# Patient Record
Sex: FEMALE | Race: BLACK OR AFRICAN AMERICAN | NOT HISPANIC OR LATINO | URBAN - METROPOLITAN AREA
[De-identification: names, ages, dates, MRNs, and addresses within clinical notes are randomized per-mention and may not be internally consistent; named-entity substitution may affect disease eponyms.]

---

## 2024-02-19 ENCOUNTER — OFFICE VISIT (OUTPATIENT)
Dept: PRIMARY CARE CLINIC | Facility: CLINIC | Age: 64
End: 2024-02-19
Payer: MEDICARE

## 2024-02-19 ENCOUNTER — TELEPHONE (OUTPATIENT)
Dept: PRIMARY CARE CLINIC | Facility: CLINIC | Age: 64
End: 2024-02-19

## 2024-02-19 VITALS
WEIGHT: 222 LBS | SYSTOLIC BLOOD PRESSURE: 132 MMHG | DIASTOLIC BLOOD PRESSURE: 86 MMHG | HEART RATE: 80 BPM | BODY MASS INDEX: 36.99 KG/M2 | OXYGEN SATURATION: 97 % | TEMPERATURE: 98 F | HEIGHT: 65 IN

## 2024-02-19 DIAGNOSIS — F43.9 STRESS: ICD-10-CM

## 2024-02-19 DIAGNOSIS — E08.65 DIABETES MELLITUS DUE TO UNDERLYING CONDITION WITH HYPERGLYCEMIA, WITHOUT LONG-TERM CURRENT USE OF INSULIN: ICD-10-CM

## 2024-02-19 DIAGNOSIS — Z00.00 HEALTHCARE MAINTENANCE: ICD-10-CM

## 2024-02-19 DIAGNOSIS — E11.9 TYPE 2 DIABETES MELLITUS WITHOUT COMPLICATION, WITHOUT LONG-TERM CURRENT USE OF INSULIN: ICD-10-CM

## 2024-02-19 DIAGNOSIS — E66.01 SEVERE OBESITY (BMI 35.0-39.9) WITH COMORBIDITY: ICD-10-CM

## 2024-02-19 DIAGNOSIS — E66.9 OBESITY (BMI 35.0-39.9 WITHOUT COMORBIDITY): ICD-10-CM

## 2024-02-19 DIAGNOSIS — Z76.89 ENCOUNTER TO ESTABLISH CARE WITH NEW DOCTOR: Primary | ICD-10-CM

## 2024-02-19 DIAGNOSIS — Z12.31 ENCOUNTER FOR SCREENING MAMMOGRAM FOR MALIGNANT NEOPLASM OF BREAST: ICD-10-CM

## 2024-02-19 PROBLEM — E78.5 HYPERLIPIDEMIA LDL GOAL <100: Status: ACTIVE | Noted: 2021-03-01

## 2024-02-19 LAB
ALBUMIN SERPL BCP-MCNC: 3.8 G/DL (ref 3.5–5.2)
ALP SERPL-CCNC: 114 U/L (ref 55–135)
ALT SERPL W/O P-5'-P-CCNC: 32 U/L (ref 10–44)
ANION GAP SERPL CALC-SCNC: 11 MMOL/L (ref 8–16)
AST SERPL-CCNC: 23 U/L (ref 10–40)
BILIRUB SERPL-MCNC: 0.2 MG/DL (ref 0.1–1)
BUN SERPL-MCNC: 15 MG/DL (ref 8–23)
CALCIUM SERPL-MCNC: 10 MG/DL (ref 8.7–10.5)
CHLORIDE SERPL-SCNC: 107 MMOL/L (ref 95–110)
CHOLEST SERPL-MCNC: 194 MG/DL (ref 120–199)
CHOLEST/HDLC SERPL: 4.6 {RATIO} (ref 2–5)
CO2 SERPL-SCNC: 24 MMOL/L (ref 23–29)
CREAT SERPL-MCNC: 0.8 MG/DL (ref 0.5–1.4)
ERYTHROCYTE [DISTWIDTH] IN BLOOD BY AUTOMATED COUNT: 14.2 % (ref 11.5–14.5)
EST. GFR  (NO RACE VARIABLE): >60 ML/MIN/1.73 M^2
GLUCOSE SERPL-MCNC: 84 MG/DL (ref 70–110)
HCT VFR BLD AUTO: 35.8 % (ref 37–48.5)
HCV AB SERPL QL IA: NORMAL
HDLC SERPL-MCNC: 42 MG/DL (ref 40–75)
HDLC SERPL: 21.6 % (ref 20–50)
HGB BLD-MCNC: 11.3 G/DL (ref 12–16)
HIV 1+2 AB+HIV1 P24 AG SERPL QL IA: NORMAL
LDLC SERPL CALC-MCNC: 119 MG/DL (ref 63–159)
MCH RBC QN AUTO: 29.4 PG (ref 27–31)
MCHC RBC AUTO-ENTMCNC: 31.6 G/DL (ref 32–36)
MCV RBC AUTO: 93 FL (ref 82–98)
NONHDLC SERPL-MCNC: 152 MG/DL
PLATELET # BLD AUTO: 405 K/UL (ref 150–450)
PMV BLD AUTO: 9.7 FL (ref 9.2–12.9)
POTASSIUM SERPL-SCNC: 4.3 MMOL/L (ref 3.5–5.1)
PROT SERPL-MCNC: 8.1 G/DL (ref 6–8.4)
RBC # BLD AUTO: 3.84 M/UL (ref 4–5.4)
SODIUM SERPL-SCNC: 142 MMOL/L (ref 136–145)
TRIGL SERPL-MCNC: 165 MG/DL (ref 30–150)
TSH SERPL DL<=0.005 MIU/L-ACNC: 0.62 UIU/ML (ref 0.4–4)
WBC # BLD AUTO: 8.81 K/UL (ref 3.9–12.7)

## 2024-02-19 PROCEDURE — 86803 HEPATITIS C AB TEST: CPT | Performed by: FAMILY MEDICINE

## 2024-02-19 PROCEDURE — 3075F SYST BP GE 130 - 139MM HG: CPT | Mod: CPTII,S$GLB,, | Performed by: FAMILY MEDICINE

## 2024-02-19 PROCEDURE — 80061 LIPID PANEL: CPT | Performed by: FAMILY MEDICINE

## 2024-02-19 PROCEDURE — 84443 ASSAY THYROID STIM HORMONE: CPT | Performed by: FAMILY MEDICINE

## 2024-02-19 PROCEDURE — 3008F BODY MASS INDEX DOCD: CPT | Mod: CPTII,S$GLB,, | Performed by: FAMILY MEDICINE

## 2024-02-19 PROCEDURE — 3079F DIAST BP 80-89 MM HG: CPT | Mod: CPTII,S$GLB,, | Performed by: FAMILY MEDICINE

## 2024-02-19 PROCEDURE — 80053 COMPREHEN METABOLIC PANEL: CPT | Performed by: FAMILY MEDICINE

## 2024-02-19 PROCEDURE — 1101F PT FALLS ASSESS-DOCD LE1/YR: CPT | Mod: CPTII,S$GLB,, | Performed by: FAMILY MEDICINE

## 2024-02-19 PROCEDURE — 87389 HIV-1 AG W/HIV-1&-2 AB AG IA: CPT | Performed by: FAMILY MEDICINE

## 2024-02-19 PROCEDURE — 85027 COMPLETE CBC AUTOMATED: CPT | Performed by: FAMILY MEDICINE

## 2024-02-19 PROCEDURE — 1160F RVW MEDS BY RX/DR IN RCRD: CPT | Mod: CPTII,S$GLB,, | Performed by: FAMILY MEDICINE

## 2024-02-19 PROCEDURE — 99999 PR PBB SHADOW E&M-NEW PATIENT-LVL V: CPT | Mod: PBBFAC,,, | Performed by: FAMILY MEDICINE

## 2024-02-19 PROCEDURE — 4010F ACE/ARB THERAPY RXD/TAKEN: CPT | Mod: CPTII,S$GLB,, | Performed by: FAMILY MEDICINE

## 2024-02-19 PROCEDURE — 3288F FALL RISK ASSESSMENT DOCD: CPT | Mod: CPTII,S$GLB,, | Performed by: FAMILY MEDICINE

## 2024-02-19 PROCEDURE — 83036 HEMOGLOBIN GLYCOSYLATED A1C: CPT | Performed by: FAMILY MEDICINE

## 2024-02-19 PROCEDURE — 99204 OFFICE O/P NEW MOD 45 MIN: CPT | Mod: S$GLB,,, | Performed by: FAMILY MEDICINE

## 2024-02-19 PROCEDURE — 1159F MED LIST DOCD IN RCRD: CPT | Mod: CPTII,S$GLB,, | Performed by: FAMILY MEDICINE

## 2024-02-19 PROCEDURE — 1126F AMNT PAIN NOTED NONE PRSNT: CPT | Mod: CPTII,S$GLB,, | Performed by: FAMILY MEDICINE

## 2024-02-19 RX ORDER — AMLODIPINE BESYLATE 10 MG/1
10 TABLET ORAL DAILY
COMMUNITY

## 2024-02-19 RX ORDER — OLMESARTAN MEDOXOMIL 20 MG/1
20 TABLET ORAL DAILY
COMMUNITY

## 2024-02-19 RX ORDER — METFORMIN HYDROCHLORIDE 500 MG/1
500 TABLET ORAL 2 TIMES DAILY WITH MEALS
COMMUNITY

## 2024-02-19 NOTE — PATIENT INSTRUCTIONS
Lab work today  Refer to LCSW - stress  Refer to diabetes education   Mammogram screening  Return 1 week   Addresses vaccines due at next visit        If you are feeling unwell, we'd like to be the first ones to know here at [facility]! Please give us a call. Same day appointments are our top priority to keep you well and out of the emergency rooms and hospitals. Call [phone] for our direct line. After hours advice is always available. Please call [phone] after hours to speak to the on-call team.

## 2024-02-19 NOTE — ASSESSMENT & PLAN NOTE
Mammogram - order today  Pap - na- never had abnl ones  Pneumovax?  Vaccines due - flu, rsv, hzv, Td, pcv- address next visit next week

## 2024-02-19 NOTE — PROGRESS NOTES
"Patient Care Team:  No, Primary Doctor as PCP - General  No, Primary Doctor  No future appointments.    Subjective:      Patient ID: Ekaterina De La Cruz is a 63 y.o. female.    Chief Complaint: Establish Care (Establish care )      HPI      Review of Systems  PHQ-4 Score: 3         Objective:   /86 (BP Location: Left arm, Patient Position: Sitting)   Pulse 80   Temp 98.2 °F (36.8 °C) (Oral)   Ht 5' 5" (1.651 m)   Wt 100.7 kg (222 lb 0.1 oz)   SpO2 97%   BMI 36.94 kg/m²     Physical Exam        Assessment:       No diagnosis found.      Plan:     There are no diagnoses linked to this encounter.  "

## 2024-02-19 NOTE — PROGRESS NOTES
"Patient Care Team:  Karlee Hein MD as PCP - General (Family Medicine)  Karlee Hein MD  Future Appointments   Date Time Provider Department Center   3/25/2024 10:40 AM Karlee Hein MD [facility]   4/4/2024  1:00 PM [facility]3-BX [facility] [place]   5/27/2024  9:40 AM Claudio Barber, OD [facility]       Subjective:      Patient ID: Ekaterina De La Cruz is a 63 y.o. female.    Chief Complaint: Establish Care (Establish care )      HPI  New pt here to establish care. Previous PCP at [facility], last lab more than 1 year ago.   Hope to get on ozempic for weight control    Review of Systems  PHQ-4 Score: 3 - stress from caring for mom  Lives alone but cares for elderly frail mother stressful. Grandkids visit her often. Still working owns a driving school enjoys it  Active but not exercising regularly  Goal is to lose weight, learn more about diabetes - never given any education dx a few years ago  Diet exercise - briefly introduced DM diet. Brisk walk 30' 5 times a week      Objective:   /86 (BP Location: Left arm, Patient Position: Sitting)   Pulse 80   Temp 98.2 °F (36.8 °C) (Oral)   Ht 5' 5" (1.651 m)   Wt 100.7 kg (222 lb 0.1 oz)   SpO2 97%   BMI 36.94 kg/m²     Physical Exam  Vitals and nursing note reviewed.   Constitutional:       General: She is not in acute distress.     Appearance: She is well-developed. She is not diaphoretic.   HENT:      Head: Normocephalic and atraumatic.   Eyes:      Extraocular Movements: Extraocular movements intact.      Pupils: Pupils are equal, round, and reactive to light.   Cardiovascular:      Rate and Rhythm: Normal rate and regular rhythm.   Pulmonary:      Effort: Pulmonary effort is normal.      Breath sounds: Normal breath sounds.   Musculoskeletal:      Cervical back: Neck supple.   Lymphadenopathy:      Cervical: No cervical adenopathy.   Skin:     General: Skin is warm and dry.   Neurological:      General: No focal deficit present. "      Mental Status: She is alert and oriented to person, place, and time.      Cranial Nerves: No cranial nerve deficit.       Lab work today  Refer to LCSW - stress  Refer to diabetes education   Mammogram screening  Return 1 week   Addresses vaccines due at next visit - will review outside records further      Assessment:       1. Encounter to establish care with new doctor    2. Diabetes mellitus due to underlying condition with hyperglycemia, without long-term current use of insulin    3. Stress    4. Encounter for screening mammogram for malignant neoplasm of breast    5. Obesity (BMI 35.0-39.9 without comorbidity)    6. Type 2 diabetes mellitus without complication, without long-term current use of insulin    7. Severe obesity (BMI 35.0-39.9) with comorbidity    8. Healthcare maintenance          Plan:     Encounter to establish care with new doctor  -     Ambulatory referral/consult to Diabetes Education; Future; Expected date: 02/26/2024  -     TSH; Future; Expected date: 02/19/2024  -     HIV 1/2 Ag/Ab (4th Gen); Future; Expected date: 02/19/2024  -     Hepatitis C Antibody; Future; Expected date: 02/19/2024  -     Mammo Digital Screening Bilat; Future; Expected date: 02/19/2024    Diabetes mellitus due to underlying condition with hyperglycemia, without long-term current use of insulin  -     Ambulatory referral/consult to Diabetes Education; Future; Expected date: 02/26/2024  -     CBC Without Differential; Future; Expected date: 02/19/2024  -     Comprehensive Metabolic Panel; Future; Expected date: 02/19/2024  -     Lipid Panel; Future; Expected date: 02/19/2024  -     TSH; Future; Expected date: 02/19/2024  -     Hemoglobin A1C; Future; Expected date: 02/19/2024  -     HIV 1/2 Ag/Ab (4th Gen); Future; Expected date: 02/19/2024  -     Hepatitis C Antibody; Future; Expected date: 02/19/2024    Stress  -     Ambulatory referral/consult to [facility]; Future; Expected date:  02/26/2024    Encounter for screening mammogram for malignant neoplasm of breast  -     Mammo Digital Screening Bilat; Future; Expected date: 02/19/2024    Obesity (BMI 35.0-39.9 without comorbidity)    Type 2 diabetes mellitus without complication, without long-term current use of insulin    Severe obesity (BMI 35.0-39.9) with comorbidity    Healthcare maintenance  Comments:  Mammogram - order today  Pap - na- never had abnl ones  Pneumovax?  Vaccines due - flu, rsv, hzv, Td, pcv- address next visit next week

## 2024-02-20 LAB
ESTIMATED AVG GLUCOSE: 146 MG/DL (ref 68–131)
HBA1C MFR BLD: 6.7 % (ref 4–5.6)

## 2024-02-21 ENCOUNTER — TELEPHONE (OUTPATIENT)
Dept: PRIMARY CARE CLINIC | Facility: CLINIC | Age: 64
End: 2024-02-21

## 2024-02-21 NOTE — TELEPHONE ENCOUNTER
Spoke with patient in regards to referral to diabetes education. Patient states that she instructed the person that called her in scheduling that she would contact them to schedule when she has her schedule. MANOJ Eddy RN

## 2024-02-23 PROBLEM — E66.9 OBESITY (BMI 35.0-39.9 WITHOUT COMORBIDITY): Status: RESOLVED | Noted: 2024-02-19 | Resolved: 2024-02-23

## 2024-02-23 PROBLEM — E11.69 HYPERLIPIDEMIA ASSOCIATED WITH TYPE 2 DIABETES MELLITUS: Status: ACTIVE | Noted: 2021-03-01

## 2024-02-23 NOTE — ASSESSMENT & PLAN NOTE
The 10-year ASCVD risk score (Loreta ESCOBAR, et al., 2019) is: 22.7%    Values used to calculate the score:      Age: 63 years      Sex: Female      Is Non- : Yes      Diabetic: Yes      Tobacco smoker: No      Systolic Blood Pressure: 132 mmHg      Is BP treated: Yes      HDL Cholesterol: 42 mg/dL      Total Cholesterol: 194 mg/dL

## 2024-02-23 NOTE — PROGRESS NOTES
Patient Care Team:  No, Primary Doctor as PCP - General  No, Primary Doctor  Future Appointments   Date Time Provider Department Center   2/26/2024 10:00 AM Karlee Hein MD [facility]    4/4/2024  1:00 PM [facility] M[facility]MO[facility]-BX [facility] [place]       Subjective:      Patient ID: Ekaterina De La Cruz is a 63 y.o. female.    Chief Complaint: No chief complaint on file.      HPI  Follow up initial visit last week. Her to review labs, address care Gaps. Pt desires to get on weight loss meds. Todays concern: discoloration on face over a year  Last week check out note review:  Refer to LCSW  - appt pending  Refer to diabetes education - appt pending.   Mammogram screening - appt 4/4    Office Visit on 02/19/2024   Component Date Value Ref Range Status    Hepatitis C Ab 02/19/2024 Non-reactive  Non-reactive Final    HIV 1/2 Ag/Ab 02/19/2024 Non-reactive  Non-reactive Final    Hemoglobin A1C 02/19/2024 6.7 (H)  4.0 - 5.6 % Final    Comment: ADA Screening Guidelines:  5.7-6.4%  Consistent with prediabetes  >or=6.5%  Consistent with diabetes    High levels of fetal hemoglobin interfere with the HbA1C  assay. Heterozygous hemoglobin variants (HbS, HgC, etc)do  not significantly interfere with this assay.   However, presence of multiple variants may affect accuracy.      Estimated Avg Glucose 02/19/2024 146 (H)  68 - 131 mg/dL Final    TSH 02/19/2024 0.623  0.400 - 4.000 uIU/mL Final    Cholesterol 02/19/2024 194  120 - 199 mg/dL Final    Comment: The National Cholesterol Education Program (NCEP) has set the  following guidelines (reference ranges) for Cholesterol:  Optimal.....................<200 mg/dL  Borderline High.............200-239 mg/dL  High........................> or = 240 mg/dL      Triglycerides 02/19/2024 165 (H)  30 - 150 mg/dL Final    Comment: The National Cholesterol Education Program (NCEP) has set the  following guidelines (reference values) for triglycerides:  Normal......................<150  mg/dL  Borderline High.............150-199 mg/dL  High........................200-499 mg/dL      HDL 02/19/2024 42  40 - 75 mg/dL Final    Comment: The National Cholesterol Education Program (NCEP) has set the  following guidelines (reference values) for HDL Cholesterol:  Low...............<40 mg/dL  Optimal...........>60 mg/dL      LDL Cholesterol 02/19/2024 119.0  63.0 - 159.0 mg/dL Final    Comment: The National Cholesterol Education Program (NCEP) has set the  following guidelines (reference values) for LDL Cholesterol:  Optimal.......................<130 mg/dL  Borderline High...............130-159 mg/dL  High..........................160-189 mg/dL  Very High.....................>190 mg/dL      HDL/Cholesterol Ratio 02/19/2024 21.6  20.0 - 50.0 % Final    Total Cholesterol/HDL Ratio 02/19/2024 4.6  2.0 - 5.0 Final    Non-HDL Cholesterol 02/19/2024 152  mg/dL Final    Comment: Risk category and Non-HDL cholesterol goals:  Coronary heart disease (CHD)or equivalent (10-year risk of CHD >20%):  Non-HDL cholesterol goal     <130 mg/dL  Two or more CHD risk factors and 10-year risk of CHD <= 20%:  Non-HDL cholesterol goal     <160 mg/dL  0 to 1 CHD risk factor:  Non-HDL cholesterol goal     <190 mg/dL      Sodium 02/19/2024 142  136 - 145 mmol/L Final    Potassium 02/19/2024 4.3  3.5 - 5.1 mmol/L Final    Chloride 02/19/2024 107  95 - 110 mmol/L Final    CO2 02/19/2024 24  23 - 29 mmol/L Final    Glucose 02/19/2024 84  70 - 110 mg/dL Final    BUN 02/19/2024 15  8 - 23 mg/dL Final    Creatinine 02/19/2024 0.8  0.5 - 1.4 mg/dL Final    Calcium 02/19/2024 10.0  8.7 - 10.5 mg/dL Final    Total Protein 02/19/2024 8.1  6.0 - 8.4 g/dL Final    Albumin 02/19/2024 3.8  3.5 - 5.2 g/dL Final    Total Bilirubin 02/19/2024 0.2  0.1 - 1.0 mg/dL Final    Comment: For infants and newborns, interpretation of results should be based  on gestational age, weight and in agreement with clinical  observations.    Premature Infant  recommended reference ranges:  Up to 24 hours.............<8.0 mg/dL  Up to 48 hours............<12.0 mg/dL  3-5 days..................<15.0 mg/dL  6-29 days.................<15.0 mg/dL      Alkaline Phosphatase 02/19/2024 114  55 - 135 U/L Final    AST 02/19/2024 23  10 - 40 U/L Final    ALT 02/19/2024 32  10 - 44 U/L Final    eGFR 02/19/2024 >60.0  >60 mL/min/1.73 m^2 Final    Anion Gap 02/19/2024 11  8 - 16 mmol/L Final    WBC 02/19/2024 8.81  3.90 - 12.70 K/uL Final    RBC 02/19/2024 3.84 (L)  4.00 - 5.40 M/uL Final    Hemoglobin 02/19/2024 11.3 (L)  12.0 - 16.0 g/dL Final    Hematocrit 02/19/2024 35.8 (L)  37.0 - 48.5 % Final    MCV 02/19/2024 93  82 - 98 fL Final    MCH 02/19/2024 29.4  27.0 - 31.0 pg Final    MCHC 02/19/2024 31.6 (L)  32.0 - 36.0 g/dL Final    RDW 02/19/2024 14.2  11.5 - 14.5 % Final    Platelets 02/19/2024 405  150 - 450 K/uL Final    MPV 02/19/2024 9.7  9.2 - 12.9 fL Final        Review of Systems  PHQ-4 Score: 3   Last worry score 3    Active ongoing problems and medications, labs reviewed  Lives alone but cares for elderly frail mother, stressful. Grandkids visit her often. Still working owns a driving school enjoys it  Active but not exercising regularly  Goal is to lose weight, learn more about diabetes - never given any education dx a few years ago  Diet exercise - briefly introduced DM diet. Brisk walk 30' 5 times a week  ACP -     Objective:   There were no vitals taken for this visit.    Physical Exam  Vitals and nursing note reviewed.   Constitutional:       General: She is not in acute distress.     Appearance: Normal appearance. She is well-developed. She is not diaphoretic.   HENT:      Head: Normocephalic and atraumatic.   Eyes:      Extraocular Movements: Extraocular movements intact.      Pupils: Pupils are equal, round, and reactive to light.   Cardiovascular:      Rate and Rhythm: Normal rate and regular rhythm.      Pulses:           Dorsalis pedis pulses are 1+ on the  right side and 1+ on the left side.   Pulmonary:      Effort: Pulmonary effort is normal.      Breath sounds: Normal breath sounds.   Musculoskeletal:      Cervical back: Neck supple.   Feet:      Right foot:      Protective Sensation: 5 sites tested.  5 sites sensed.      Skin integrity: Dry skin present. No callus.      Toenail Condition: Fungal disease present.     Left foot:      Protective Sensation: 5 sites tested.  5 sites sensed.      Skin integrity: Dry skin present. No callus.      Toenail Condition: Fungal disease present.  Lymphadenopathy:      Cervical: No cervical adenopathy.   Skin:     General: Skin is warm and dry.      Comments: Darkened skin upper face   Neurological:      General: No focal deficit present.      Mental Status: She is alert and oriented to person, place, and time.      Cranial Nerves: No cranial nerve deficit.       Assessment:       1. Essential hypertension    2. Hyperlipidemia associated with type 2 diabetes mellitus    3. Type 2 diabetes mellitus without complication, without long-term current use of insulin    4. Obesity (BMI 35.0-39.9 without comorbidity)    5. Severe obesity (BMI 35.0-39.9) with comorbidity    6. Health care maintenance      Tetnus boost, shingles and RSV vaccines due,  avail at pharmacy  New Rx today; lipitor 10 mg daily for cholesterol, mounjaro inj weekly for weight and DM  Dermatology referral  Please get familiar with MyChart    Plan:     1. Essential hypertension  Overview:  Goal < 130/80      2. Hyperlipidemia associated with type 2 diabetes mellitus  Overview:  LDL goal <70    Assessment & Plan:  The 10-year ASCVD risk score (Loreta ESCOBAR, et al., 2019) is: 22.7%    Values used to calculate the score:      Age: 63 years      Sex: Female      Is Non- : Yes      Diabetic: Yes      Tobacco smoker: No      Systolic Blood Pressure: 132 mmHg      Is BP treated: Yes      HDL Cholesterol: 42 mg/dL      Total Cholesterol: 194 mg/dL        3. Type 2 diabetes mellitus without complication, without long-term current use of insulin  Overview:  Dx 4 -5 years ago. On metformin    Assessment & Plan:  Lab Results   Component Value Date    HGBA1C 6.7 (H) 02/19/2024          4. Obesity (BMI 35.0-39.9 without comorbidity)    5. Severe obesity (BMI 35.0-39.9) with comorbidity  Assessment & Plan:  Rx Brisk walk 30' 5 times a week   Pt desires opempic      6. Health care maintenance        Health Maintenance         Date Due Completion Date    Diabetes Urine Screening Never done ---    Pneumococcal Vaccines (Age 65+) (1 of 2 - PCV) Never done ---    Foot Exam Never done ---    TETANUS VACCINE Never done ---    Mammogram Never done ---    DEXA Scan Never done ---    Shingles Vaccine (1 of 2) Never done ---    RSV Vaccine (Age 60+ and Pregnant patients) (1 - 1-dose 60+ series) Never done ---    Eye Exam 04/28/2022 4/28/2021    Influenza Vaccine (1) 09/01/2023 12/19/2022    COVID-19 Vaccine (3 - 2023-24 season) 02/23/2025 (Originally 9/1/2023) 4/22/2021    Hemoglobin A1c 08/19/2024 2/19/2024    Low Dose Statin 12/29/2024 12/29/2023    Lipid Panel 02/19/2025 2/19/2024    Colorectal Cancer Screening 08/10/2025 ---

## 2024-02-26 ENCOUNTER — OFFICE VISIT (OUTPATIENT)
Dept: PRIMARY CARE CLINIC | Facility: CLINIC | Age: 64
End: 2024-02-26
Payer: MEDICARE

## 2024-02-26 VITALS
HEIGHT: 65 IN | BODY MASS INDEX: 36.86 KG/M2 | HEART RATE: 74 BPM | OXYGEN SATURATION: 96 % | WEIGHT: 221.25 LBS | DIASTOLIC BLOOD PRESSURE: 86 MMHG | SYSTOLIC BLOOD PRESSURE: 140 MMHG | TEMPERATURE: 98 F

## 2024-02-26 DIAGNOSIS — L81.9 DISCOLORATION OF SKIN OF FACE: ICD-10-CM

## 2024-02-26 DIAGNOSIS — E66.01 SEVERE OBESITY (BMI 35.0-39.9) WITH COMORBIDITY: ICD-10-CM

## 2024-02-26 DIAGNOSIS — F33.9 MAJOR DEPRESSIVE DISORDER, RECURRENT EPISODE WITH ANXIOUS DISTRESS: ICD-10-CM

## 2024-02-26 DIAGNOSIS — I10 ESSENTIAL HYPERTENSION: ICD-10-CM

## 2024-02-26 DIAGNOSIS — Z23 NEED FOR VACCINATION: ICD-10-CM

## 2024-02-26 DIAGNOSIS — E11.9 TYPE 2 DIABETES MELLITUS WITHOUT COMPLICATION, WITHOUT LONG-TERM CURRENT USE OF INSULIN: Primary | ICD-10-CM

## 2024-02-26 DIAGNOSIS — Z00.00 HEALTH CARE MAINTENANCE: ICD-10-CM

## 2024-02-26 DIAGNOSIS — E11.69 HYPERLIPIDEMIA ASSOCIATED WITH TYPE 2 DIABETES MELLITUS: ICD-10-CM

## 2024-02-26 DIAGNOSIS — E78.5 HYPERLIPIDEMIA ASSOCIATED WITH TYPE 2 DIABETES MELLITUS: ICD-10-CM

## 2024-02-26 LAB
ALBUMIN/CREAT UR: 13.9 UG/MG (ref 0–30)
CREAT UR-MCNC: 36 MG/DL (ref 15–325)
MICROALBUMIN UR DL<=1MG/L-MCNC: 5 UG/ML

## 2024-02-26 PROCEDURE — 99999 PR PBB SHADOW E&M-EST. PATIENT-LVL IV: CPT | Mod: PBBFAC,,, | Performed by: FAMILY MEDICINE

## 2024-02-26 PROCEDURE — G0008 ADMIN INFLUENZA VIRUS VAC: HCPCS | Mod: S$GLB,,, | Performed by: FAMILY MEDICINE

## 2024-02-26 PROCEDURE — 3079F DIAST BP 80-89 MM HG: CPT | Mod: CPTII,S$GLB,, | Performed by: FAMILY MEDICINE

## 2024-02-26 PROCEDURE — 3008F BODY MASS INDEX DOCD: CPT | Mod: CPTII,S$GLB,, | Performed by: FAMILY MEDICINE

## 2024-02-26 PROCEDURE — 3077F SYST BP >= 140 MM HG: CPT | Mod: CPTII,S$GLB,, | Performed by: FAMILY MEDICINE

## 2024-02-26 PROCEDURE — 90694 VACC AIIV4 NO PRSRV 0.5ML IM: CPT | Mod: S$GLB,,, | Performed by: FAMILY MEDICINE

## 2024-02-26 PROCEDURE — 82043 UR ALBUMIN QUANTITATIVE: CPT | Performed by: FAMILY MEDICINE

## 2024-02-26 PROCEDURE — 1101F PT FALLS ASSESS-DOCD LE1/YR: CPT | Mod: CPTII,S$GLB,, | Performed by: FAMILY MEDICINE

## 2024-02-26 PROCEDURE — 1126F AMNT PAIN NOTED NONE PRSNT: CPT | Mod: CPTII,S$GLB,, | Performed by: FAMILY MEDICINE

## 2024-02-26 PROCEDURE — 90677 PCV20 VACCINE IM: CPT | Mod: S$GLB,,, | Performed by: FAMILY MEDICINE

## 2024-02-26 PROCEDURE — 1160F RVW MEDS BY RX/DR IN RCRD: CPT | Mod: CPTII,S$GLB,, | Performed by: FAMILY MEDICINE

## 2024-02-26 PROCEDURE — 4010F ACE/ARB THERAPY RXD/TAKEN: CPT | Mod: CPTII,S$GLB,, | Performed by: FAMILY MEDICINE

## 2024-02-26 PROCEDURE — 1159F MED LIST DOCD IN RCRD: CPT | Mod: CPTII,S$GLB,, | Performed by: FAMILY MEDICINE

## 2024-02-26 PROCEDURE — G0009 ADMIN PNEUMOCOCCAL VACCINE: HCPCS | Mod: S$GLB,,, | Performed by: FAMILY MEDICINE

## 2024-02-26 PROCEDURE — 3044F HG A1C LEVEL LT 7.0%: CPT | Mod: CPTII,S$GLB,, | Performed by: FAMILY MEDICINE

## 2024-02-26 PROCEDURE — 99214 OFFICE O/P EST MOD 30 MIN: CPT | Mod: 25,S$GLB,, | Performed by: FAMILY MEDICINE

## 2024-02-26 PROCEDURE — 3288F FALL RISK ASSESSMENT DOCD: CPT | Mod: CPTII,S$GLB,, | Performed by: FAMILY MEDICINE

## 2024-02-26 RX ORDER — ATORVASTATIN CALCIUM 10 MG/1
10 TABLET, FILM COATED ORAL DAILY
Qty: 90 TABLET | Refills: 3 | Status: SHIPPED | OUTPATIENT
Start: 2024-02-26

## 2024-02-26 RX ORDER — TIRZEPATIDE 5 MG/.5ML
5 INJECTION, SOLUTION SUBCUTANEOUS
Qty: 12 PEN | Refills: 3 | Status: SHIPPED | OUTPATIENT
Start: 2024-02-26

## 2024-02-26 NOTE — PATIENT INSTRUCTIONS
Tetnus boost, shingles and RSV vaccines due,  avail at pharmacy  New Rx today; lipitor 10 mg daily for cholesterol, mounjaro inj weekly for weight and DM  Dermatology referral  Please get familiar with MyChart    .If you are feeling unwell, we'd like to be the first ones to know here at [facility]! Please give us a call. Same day appointments are our top priority to keep you well and out of the emergency rooms and hospitals. Call [phone] for our direct line. After hours advice is always available. Please call [phone] after hours to speak to the on-call team.

## 2024-03-12 ENCOUNTER — CLINICAL SUPPORT (OUTPATIENT)
Dept: PRIMARY CARE CLINIC | Facility: CLINIC | Age: 64
End: 2024-03-12
Payer: MEDICARE

## 2024-03-12 DIAGNOSIS — E11.9 TYPE 2 DIABETES MELLITUS WITHOUT COMPLICATION, WITHOUT LONG-TERM CURRENT USE OF INSULIN: Primary | ICD-10-CM

## 2024-03-25 ENCOUNTER — OFFICE VISIT (OUTPATIENT)
Dept: PRIMARY CARE CLINIC | Facility: CLINIC | Age: 64
End: 2024-03-25
Payer: MEDICARE

## 2024-03-25 VITALS
DIASTOLIC BLOOD PRESSURE: 72 MMHG | HEART RATE: 81 BPM | OXYGEN SATURATION: 98 % | BODY MASS INDEX: 36.58 KG/M2 | SYSTOLIC BLOOD PRESSURE: 130 MMHG | HEIGHT: 65 IN | TEMPERATURE: 98 F | WEIGHT: 219.56 LBS

## 2024-03-25 DIAGNOSIS — E11.69 HYPERLIPIDEMIA ASSOCIATED WITH TYPE 2 DIABETES MELLITUS: ICD-10-CM

## 2024-03-25 DIAGNOSIS — E78.5 HYPERLIPIDEMIA ASSOCIATED WITH TYPE 2 DIABETES MELLITUS: ICD-10-CM

## 2024-03-25 DIAGNOSIS — Z78.0 ENCOUNTER FOR OSTEOPOROSIS SCREENING IN ASYMPTOMATIC POSTMENOPAUSAL PATIENT: ICD-10-CM

## 2024-03-25 DIAGNOSIS — Z00.00 HEALTHCARE MAINTENANCE: ICD-10-CM

## 2024-03-25 DIAGNOSIS — E11.9 TYPE 2 DIABETES MELLITUS WITHOUT COMPLICATION, WITHOUT LONG-TERM CURRENT USE OF INSULIN: Primary | ICD-10-CM

## 2024-03-25 DIAGNOSIS — Z13.820 ENCOUNTER FOR OSTEOPOROSIS SCREENING IN ASYMPTOMATIC POSTMENOPAUSAL PATIENT: ICD-10-CM

## 2024-03-25 DIAGNOSIS — I10 ESSENTIAL HYPERTENSION: ICD-10-CM

## 2024-03-25 DIAGNOSIS — F33.9 MAJOR DEPRESSIVE DISORDER, RECURRENT EPISODE WITH ANXIOUS DISTRESS: ICD-10-CM

## 2024-03-25 DIAGNOSIS — E66.01 SEVERE OBESITY (BMI 35.0-39.9) WITH COMORBIDITY: ICD-10-CM

## 2024-03-25 PROCEDURE — 3061F NEG MICROALBUMINURIA REV: CPT | Mod: CPTII,S$GLB,, | Performed by: FAMILY MEDICINE

## 2024-03-25 PROCEDURE — 1160F RVW MEDS BY RX/DR IN RCRD: CPT | Mod: CPTII,S$GLB,, | Performed by: FAMILY MEDICINE

## 2024-03-25 PROCEDURE — 4010F ACE/ARB THERAPY RXD/TAKEN: CPT | Mod: CPTII,S$GLB,, | Performed by: FAMILY MEDICINE

## 2024-03-25 PROCEDURE — 3044F HG A1C LEVEL LT 7.0%: CPT | Mod: CPTII,S$GLB,, | Performed by: FAMILY MEDICINE

## 2024-03-25 PROCEDURE — 1101F PT FALLS ASSESS-DOCD LE1/YR: CPT | Mod: CPTII,S$GLB,, | Performed by: FAMILY MEDICINE

## 2024-03-25 PROCEDURE — 3078F DIAST BP <80 MM HG: CPT | Mod: CPTII,S$GLB,, | Performed by: FAMILY MEDICINE

## 2024-03-25 PROCEDURE — 3075F SYST BP GE 130 - 139MM HG: CPT | Mod: CPTII,S$GLB,, | Performed by: FAMILY MEDICINE

## 2024-03-25 PROCEDURE — 1159F MED LIST DOCD IN RCRD: CPT | Mod: CPTII,S$GLB,, | Performed by: FAMILY MEDICINE

## 2024-03-25 PROCEDURE — 3008F BODY MASS INDEX DOCD: CPT | Mod: CPTII,S$GLB,, | Performed by: FAMILY MEDICINE

## 2024-03-25 PROCEDURE — 99999 PR PBB SHADOW E&M-EST. PATIENT-LVL V: CPT | Mod: PBBFAC,,, | Performed by: FAMILY MEDICINE

## 2024-03-25 PROCEDURE — 3288F FALL RISK ASSESSMENT DOCD: CPT | Mod: CPTII,S$GLB,, | Performed by: FAMILY MEDICINE

## 2024-03-25 PROCEDURE — 1126F AMNT PAIN NOTED NONE PRSNT: CPT | Mod: CPTII,S$GLB,, | Performed by: FAMILY MEDICINE

## 2024-03-25 PROCEDURE — 3066F NEPHROPATHY DOC TX: CPT | Mod: CPTII,S$GLB,, | Performed by: FAMILY MEDICINE

## 2024-03-25 PROCEDURE — 99214 OFFICE O/P EST MOD 30 MIN: CPT | Mod: S$GLB,,, | Performed by: FAMILY MEDICINE

## 2024-03-25 NOTE — PROGRESS NOTES
"Patient Care Team:  Karlee Hein MD as PCP - General (Family Medicine)  Karlee Hein MD  Future Appointments   Date Time Provider Department Center   4/1/2024  2:15 PM [facility] BMD1 [facility] DEX[facility]   4/1/2024  2:45 PM Eliza Rice, DPM [facility] POD [facility]   4/4/2024  1:00 PM [facility]3-BX [facility] [place]   5/27/2024  9:40 AM Claudio Barber, OD [facility] [place]   6/13/2024 10:00 AM Karlee Hein MD [facility]       Subjective:      Patient ID: Ekaterina De La Cruz is a 64 y.o. female.    Chief Complaint: Follow-up (1 month follow up)      HPI    Last visit 1 mo ago check out note:  "Tetnus boost, shingles and RSV vaccines due,  avail at pharmacy  New Rx today; lipitor 10 mg daily for cholesterol, mounjaro inj weekly for weight and DM  Dermatology referral  Please get familiar with MyChart"  Pt reports no side effect with lipitor. With mounjaro she only had 2 doses so far, could feel something different to the body, but no ill effect. Eats less  Work busy, glad appt not as frequent next go around     Review of Systems  PHQ-4 Score: 3   Last worry score 3    Active ongoing problems, labs and medications reviewed  Fall risks reviewed -   IADL/ADLs reviewed - independent. Lives   Diet and exercises reviewed - will start work out here in clinic  Goal/Matters most  Home BP -   Home BS -   ACP -   The 10-year ASCVD risk score (Loreta ESCOBAR, et al., 2019) is: 22.7%    Values used to calculate the score:      Age: 64 years      Sex: Female      Is Non- : Yes      Diabetic: Yes      Tobacco smoker: No      Systolic Blood Pressure: 130 mmHg      Is BP treated: Yes      HDL Cholesterol: 42 mg/dL      Total Cholesterol: 194 mg/dL     Objective:   /72 (BP Location: Left arm, Patient Position: Sitting)   Pulse 81   Temp 98 °F (36.7 °C) (Oral)   Ht 5' 5" (1.651 m)   Wt 99.6 kg (219 lb 9.3 oz)   SpO2 98%   BMI 36.54 kg/m²     Physical Exam  Vitals and " nursing note reviewed.   Constitutional:       Appearance: Normal appearance.   Neurological:      General: No focal deficit present.      Mental Status: She is alert and oriented to person, place, and time.     Down 2 lbs    Assessment      Problem List Items Addressed This Visit          Psychiatric    Major depressive disorder, recurrent episode with anxious distress    Overview     PHQ-4 Score: 3            Current Assessment & Plan     LCSW appt pending  Work busy, coping ok, at the moment            Cardiac/Vascular    Essential hypertension    Overview     On olmesartan 20 and amlodipine 10  Bp goal <130/80         Current Assessment & Plan     At goal today. Cont current meds         Hyperlipidemia associated with type 2 diabetes mellitus    Overview     LDL goal <70         Current Assessment & Plan     Got on lipitor 10 without SE  . Will recheck lipids next visit            Endocrine    Type 2 diabetes mellitus without complication, without long-term current use of insulin - Primary    Overview     Dx 4 -5 years ago. On metformin         Current Assessment & Plan     Hga1c 7.1. no added mounjaro         Relevant Orders    Ambulatory referral/consult to Podiatry    DXA Bone Density Axial Skeleton 1 or more sites    Severe obesity (BMI 35.0-39.9) with comorbidity    Overview     Tirepatide  5 mg started 3/2024.          Current Assessment & Plan     New on mounjaro 5 mg. Tolerated well - cont.            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, busy work  Cont current medicine  Follow up 3 mo - check A1c and lipids    Health Maintenance         Date Due Completion Date    TETANUS VACCINE Never done ---    Mammogram Never done ---    DEXA Scan Never done ---    Shingles Vaccine (1 of 2) Never done ---    RSV Vaccine (Age 60+ and Pregnant patients) (1 - 1-dose 60+ series) Never done ---    Eye Exam 08/23/2023 8/23/2022    COVID-19 Vaccine (3 - 2023-24 season) 02/23/2025 (Originally 9/1/2023) 4/22/2021    Hemoglobin A1c 08/19/2024 2/19/2024    Lipid Panel 02/19/2025 2/19/2024    Low Dose Statin 02/26/2025 2/26/2024    Diabetes Urine Screening 02/26/2025 2/26/2024    Foot Exam 02/26/2025 2/26/2024    Override on 2/26/2024: Done (little toe fungal nails, otherwise nl)    Colorectal Cancer Screening 08/10/2025 8/11/2020

## 2024-03-25 NOTE — PATIENT INSTRUCTIONS
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 Please give us a call. Same day appointments are our top priority to keep you well and out of the emergency rooms and hospitals. Call [phone] for our direct line. After hours advice is always available. Please call [phone] after hours to speak to the on-call team.

## 2024-04-08 ENCOUNTER — TELEPHONE (OUTPATIENT)
Dept: PRIMARY CARE CLINIC | Facility: CLINIC | Age: 64
End: 2024-04-08

## 2024-04-08 NOTE — TELEPHONE ENCOUNTER
Spoke with patient by phone today; she declines an LCSW appointment at this time.  She will reach out in the future if she decides to schedule an LCSW appointment.

## 2024-04-23 ENCOUNTER — OFFICE VISIT (OUTPATIENT)
Dept: PODIATRY | Facility: CLINIC | Age: 64
End: 2024-04-23
Payer: MEDICARE

## 2024-04-23 DIAGNOSIS — L60.3 ONYCHODYSTROPHY: ICD-10-CM

## 2024-04-23 DIAGNOSIS — E11.9 TYPE 2 DIABETES MELLITUS WITHOUT COMPLICATION, WITHOUT LONG-TERM CURRENT USE OF INSULIN: ICD-10-CM

## 2024-04-23 DIAGNOSIS — E11.9 ENCOUNTER FOR COMPREHENSIVE DIABETIC FOOT EXAMINATION, TYPE 2 DIABETES MELLITUS: Primary | ICD-10-CM

## 2024-04-23 PROCEDURE — 3066F NEPHROPATHY DOC TX: CPT | Mod: CPTII,S$GLB,, | Performed by: PODIATRIST

## 2024-04-23 PROCEDURE — 4010F ACE/ARB THERAPY RXD/TAKEN: CPT | Mod: CPTII,S$GLB,, | Performed by: PODIATRIST

## 2024-04-23 PROCEDURE — 3288F FALL RISK ASSESSMENT DOCD: CPT | Mod: CPTII,S$GLB,, | Performed by: PODIATRIST

## 2024-04-23 PROCEDURE — 1159F MED LIST DOCD IN RCRD: CPT | Mod: CPTII,S$GLB,, | Performed by: PODIATRIST

## 2024-04-23 PROCEDURE — 3044F HG A1C LEVEL LT 7.0%: CPT | Mod: CPTII,S$GLB,, | Performed by: PODIATRIST

## 2024-04-23 PROCEDURE — 1101F PT FALLS ASSESS-DOCD LE1/YR: CPT | Mod: CPTII,S$GLB,, | Performed by: PODIATRIST

## 2024-04-23 PROCEDURE — 99999 PR PBB SHADOW E&M-EST. PATIENT-LVL III: CPT | Mod: PBBFAC,,, | Performed by: PODIATRIST

## 2024-04-23 PROCEDURE — 1160F RVW MEDS BY RX/DR IN RCRD: CPT | Mod: CPTII,S$GLB,, | Performed by: PODIATRIST

## 2024-04-23 PROCEDURE — 3061F NEG MICROALBUMINURIA REV: CPT | Mod: CPTII,S$GLB,, | Performed by: PODIATRIST

## 2024-04-23 PROCEDURE — 99203 OFFICE O/P NEW LOW 30 MIN: CPT | Mod: S$GLB,,, | Performed by: PODIATRIST

## 2024-04-23 NOTE — PROGRESS NOTES
Subjective:       Patient ID: Ekaterina De La Cruz is a 64 y.o. female.    Chief Complaint: Diabetic Foot Exam (Patient is a diabetic and denies pain at present. 3.25.24 by Angel Luis Desir)      HPI: Ekaterina De La Cruz presents to the office today, under referral by , Karlee Hein MD, for her annual diabetic foot assessment and risk evaluation.  Patient is a DMII.  States 0/10 pain to the right left foot.  Ambulating with a nonantalgic gait.  Denies any recent falls or injury.  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  This patient last saw his/her internal/family medicine physician on 03/25/2024.     Hemoglobin A1C   Date Value Ref Range Status   02/19/2024 6.7 (H) 4.0 - 5.6 % Final     Comment:     ADA Screening Guidelines:  5.7-6.4%  Consistent with prediabetes  >or=6.5%  Consistent with diabetes    High levels of fetal hemoglobin interfere with the HbA1C  assay. Heterozygous hemoglobin variants (HbS, HgC, etc)do  not significantly interfere with this assay.   However, presence of multiple variants may affect accuracy.     12/19/2022 6.8 (H) 4.2 - 5.8 % Final   08/23/2022 6.5 (H) 4.2 - 5.8 % Final   04/08/2022 6.9 (H) 4.2 - 5.8 % Final   .    Review of patient's allergies indicates:  No Known Allergies    Past Medical History:   Diagnosis Date    Hypertension        No family history on file.    Social History     Socioeconomic History    Marital status: Single   Tobacco Use    Smoking status: Never    Smokeless tobacco: Never   Substance and Sexual Activity    Alcohol use: Never       No past surgical history on file.    Review of Systems        Objective:   There were no vitals taken for this visit.    Physical Exam  LOWER EXTREMITY PHYSICAL EXAMINATION    ORTHOPEDIC: No pain on palpation of the foot or ankle.   Range of motion within normal limits of the ankle joint, rearfoot, and forefoot.  Manual muscle strength testing is 5/5 with dorsiflexion, plantar flexion, abduction and abduction of  the lower extremity.  No pain with or without resistance.  The patient is a full to ambulate without pain or discomfort.  The patient's gait is non antalgic.  The patient does not utilize any assistive device for ambulation.    VASCULAR:  The right dorsalis pedis pulse 2/4 and the right posterior tibial pulse 2/4.  The left dorsalis pedis pulse 2/4 and posterior tibial pulse on the left is 2/4.  Capillary refill is intact.  Pedal hair growth intact    NEUROLOGY:  Sharp/dull, light touch, proprioception all intact and equal bilaterally.  Vibratory sensation is intact.  Protective sensation intact    DERMATOLOGY:  Skin is dry intact.  There is no signs of callusing, ulcerations, other lesions identified to the dorsal or plantar aspect of the right or left foot.  The R1, 5 and left L1,5 are thickened, discolored dystrophic.  There is subungual debris.  Nail plates have area of dark discoloration.  The remaining nails 2-4 on the right foot and the left foot are elongated but of normal color, thickness, and texture.   There is no signs of ingrowing into the medial or lateral borders.  There is no evidence of wounds or skin breakdown.  No edema or erythema.  No obvious lacerations or fissuring.  Interdigital spaces are clean, dry, intact.  No rashes or scars appreciated.    Assessment:     1. Encounter for comprehensive diabetic foot examination, type 2 diabetes mellitus    2. Type 2 diabetes mellitus without complication, without long-term current use of insulin    3. Onychodystrophy        Plan:     Encounter for comprehensive diabetic foot examination, type 2 diabetes mellitus    Type 2 diabetes mellitus without complication, without long-term current use of insulin  -     Ambulatory referral/consult to Podiatry    Onychodystrophy      I counseled the patient on his/her Diabetic Mellitus regarding today's clinical examination and objection findings. We did also discuss recent medication changes, pertinent labs and  imaging evaluations and other medical consultation notes and progress notes. Greater than 50% of this visit was spent on counseling and coordination of care. Greater than 20 minutes of this appt. was spent on education about the diabetic foot, in relation to PVD and/or neuropathy, and the prevention of limb loss.     Shoe gear is inspected and wear and proper fit/type. Patient is reminded of the importance of good nutrition and blood sugar control to help prevent podiatric complications of diabetes. Patient instructed on proper foot hygeine. We discussed wearing proper shoe gear, daily foot inspections, never walking without protective shoe gear, never putting sharp instruments to feet.  Patient  will continue to monitor the areas daily, inspect feet, wear protective shoe gear when ambulatory, moisturizer to maintain skin integrity.     Patient's DMI/DMII is managed by Internal/Family Medicine Physician and/or Endocrinology Advanced Practice Provider.    Dystrophic nail plates, as outlined above (R#1,5  ; L#1,5 ), are sharply debrided with double action nail nipper, and/or with the assistance of a mechanical rotary brayan, with removal of all offending nail and nail border(s), for reduction of pains. Nails are reduced in terms of length, width and girth with removal of subungual debris to facilitate pain free weight bearing and ambulation. The elongated nails as outlined in the objective portion of this note, were trimmed to appropriate length, with a double action nail nipper, for alleviation/reduction of pains as well. Follow up in approx. 3-4 months.

## 2024-05-08 ENCOUNTER — HOSPITAL ENCOUNTER (OUTPATIENT)
Dept: RADIOLOGY | Facility: HOSPITAL | Age: 64
Discharge: HOME | End: 2024-05-08
Attending: FAMILY MEDICINE
Payer: MEDICARE

## 2024-05-08 VITALS — HEIGHT: 65 IN | BODY MASS INDEX: 36.58 KG/M2 | WEIGHT: 219.56 LBS

## 2024-05-08 DIAGNOSIS — Z12.31 ENCOUNTER FOR SCREENING MAMMOGRAM FOR MALIGNANT NEOPLASM OF BREAST: ICD-10-CM

## 2024-05-08 DIAGNOSIS — Z76.89 ENCOUNTER TO ESTABLISH CARE WITH NEW DOCTOR: ICD-10-CM

## 2024-05-08 PROCEDURE — 77063 BREAST TOMOSYNTHESIS BI: CPT | Mod: TC

## 2024-05-08 PROCEDURE — 77067 SCR MAMMO BI INCL CAD: CPT | Mod: 26,,, | Performed by: RADIOLOGY

## 2024-05-08 PROCEDURE — 77063 BREAST TOMOSYNTHESIS BI: CPT | Mod: 26,,, | Performed by: RADIOLOGY

## 2024-05-20 PROBLEM — Z00.00 HEALTHCARE MAINTENANCE: Status: RESOLVED | Noted: 2024-02-19 | Resolved: 2024-05-20

## 2024-07-15 ENCOUNTER — TELEPHONE (OUTPATIENT)
Dept: PRIMARY CARE CLINIC | Facility: CLINIC | Age: 64
End: 2024-07-15

## 2024-07-15 NOTE — TELEPHONE ENCOUNTER
Message was forwarded to the Nurse Pool          ----- Message from Dontrell Ferrell sent at 7/12/2024  4:25 PM CDT -----  Contact: Ekaterina Tobar is requesting a call back to discuss her tirzepatide (MOUNJARO) 5 mg/0.5 mL PnIj  With Dr. Hein. Please give her  a call back at [phone]

## 2024-07-15 NOTE — TELEPHONE ENCOUNTER
----- Message from Karlee Hein MD sent at 7/15/2024 11:39 AM CDT -----  Regarding: RE: Mounjaro  Contact: Ekaterina  Need an appt  ----- Message -----  From: Yareli Cotter, RN  Sent: 7/15/2024   9:03 AM CDT  To: Karlee Hein MD  Subject: Mounjaro                                         Pt states that the only does of Mounjaro available is 10mg  States that she only 5mg one time - took for one month  States she has not had Mounjaro for months         [facility]  ----- Message -----  From: Chery Hernandez MA  Sent: 7/15/2024   8:18 AM CDT  To: Nurse [facility]      ----- Message -----  From: Dontrell Ferrell  Sent: 7/12/2024   4:26 PM CDT  To: Angel Luis Desir Staff    Ekaterina is requesting a call back to discuss her tirzepatide (MOUNJARO) 5 mg/0.5 mL PnIj  With Dr. Hein. Please give her  a call back at [phone]

## 2024-07-15 NOTE — TELEPHONE ENCOUNTER
Pt notified that she needs f/u appt- verbalized understanding.  States that she will call when she gets home to schedule.

## 2024-07-25 DIAGNOSIS — E11.9 TYPE 2 DIABETES MELLITUS WITHOUT COMPLICATION, WITHOUT LONG-TERM CURRENT USE OF INSULIN: ICD-10-CM

## 2024-07-25 DIAGNOSIS — E66.01 SEVERE OBESITY (BMI 35.0-39.9) WITH COMORBIDITY: ICD-10-CM

## 2024-07-25 NOTE — TELEPHONE ENCOUNTER
----- Message from Imer East MA sent at 7/25/2024 11:42 AM CDT -----  Regarding: Pharmacy change  Pt would like her prescription for Ozempic sent to [facility].    Thanks  SJ

## 2024-07-26 RX ORDER — TIRZEPATIDE 5 MG/.5ML
5 INJECTION, SOLUTION SUBCUTANEOUS
Qty: 12 PEN | Refills: 3 | Status: SHIPPED | OUTPATIENT
Start: 2024-07-26

## 2024-08-05 ENCOUNTER — TELEPHONE (OUTPATIENT)
Dept: PRIMARY CARE CLINIC | Facility: CLINIC | Age: 64
End: 2024-08-05

## 2024-08-12 ENCOUNTER — OFFICE VISIT (OUTPATIENT)
Dept: PRIMARY CARE CLINIC | Facility: CLINIC | Age: 64
End: 2024-08-12
Payer: MEDICARE

## 2024-08-12 VITALS
TEMPERATURE: 97 F | WEIGHT: 220.44 LBS | HEART RATE: 79 BPM | SYSTOLIC BLOOD PRESSURE: 126 MMHG | BODY MASS INDEX: 36.73 KG/M2 | DIASTOLIC BLOOD PRESSURE: 70 MMHG | OXYGEN SATURATION: 99 % | HEIGHT: 65 IN

## 2024-08-12 DIAGNOSIS — L81.9 DISCOLORATION OF SKIN: ICD-10-CM

## 2024-08-12 DIAGNOSIS — E11.9 TYPE 2 DIABETES MELLITUS WITHOUT COMPLICATION, WITHOUT LONG-TERM CURRENT USE OF INSULIN: Primary | ICD-10-CM

## 2024-08-12 DIAGNOSIS — E78.5 HYPERLIPIDEMIA ASSOCIATED WITH TYPE 2 DIABETES MELLITUS: ICD-10-CM

## 2024-08-12 DIAGNOSIS — E11.69 HYPERLIPIDEMIA ASSOCIATED WITH TYPE 2 DIABETES MELLITUS: ICD-10-CM

## 2024-08-12 DIAGNOSIS — I10 ESSENTIAL HYPERTENSION: ICD-10-CM

## 2024-08-12 PROCEDURE — 1159F MED LIST DOCD IN RCRD: CPT | Mod: CPTII,S$GLB,, | Performed by: FAMILY MEDICINE

## 2024-08-12 PROCEDURE — 1160F RVW MEDS BY RX/DR IN RCRD: CPT | Mod: CPTII,S$GLB,, | Performed by: FAMILY MEDICINE

## 2024-08-12 PROCEDURE — 3078F DIAST BP <80 MM HG: CPT | Mod: CPTII,S$GLB,, | Performed by: FAMILY MEDICINE

## 2024-08-12 PROCEDURE — 1101F PT FALLS ASSESS-DOCD LE1/YR: CPT | Mod: CPTII,S$GLB,, | Performed by: FAMILY MEDICINE

## 2024-08-12 PROCEDURE — 3074F SYST BP LT 130 MM HG: CPT | Mod: CPTII,S$GLB,, | Performed by: FAMILY MEDICINE

## 2024-08-12 PROCEDURE — 1126F AMNT PAIN NOTED NONE PRSNT: CPT | Mod: CPTII,S$GLB,, | Performed by: FAMILY MEDICINE

## 2024-08-12 PROCEDURE — 3008F BODY MASS INDEX DOCD: CPT | Mod: CPTII,S$GLB,, | Performed by: FAMILY MEDICINE

## 2024-08-12 PROCEDURE — 3044F HG A1C LEVEL LT 7.0%: CPT | Mod: CPTII,S$GLB,, | Performed by: FAMILY MEDICINE

## 2024-08-12 PROCEDURE — 99214 OFFICE O/P EST MOD 30 MIN: CPT | Mod: S$GLB,,, | Performed by: FAMILY MEDICINE

## 2024-08-12 PROCEDURE — 4010F ACE/ARB THERAPY RXD/TAKEN: CPT | Mod: CPTII,S$GLB,, | Performed by: FAMILY MEDICINE

## 2024-08-12 PROCEDURE — 99999 PR PBB SHADOW E&M-EST. PATIENT-LVL IV: CPT | Mod: PBBFAC,,, | Performed by: FAMILY MEDICINE

## 2024-08-12 PROCEDURE — 3288F FALL RISK ASSESSMENT DOCD: CPT | Mod: CPTII,S$GLB,, | Performed by: FAMILY MEDICINE

## 2024-08-12 PROCEDURE — 3066F NEPHROPATHY DOC TX: CPT | Mod: CPTII,S$GLB,, | Performed by: FAMILY MEDICINE

## 2024-08-12 PROCEDURE — 3061F NEG MICROALBUMINURIA REV: CPT | Mod: CPTII,S$GLB,, | Performed by: FAMILY MEDICINE

## 2024-08-12 RX ORDER — METFORMIN HYDROCHLORIDE 500 MG/1
500 TABLET, EXTENDED RELEASE ORAL
Qty: 90 TABLET | Refills: 3 | Status: SHIPPED | OUTPATIENT
Start: 2024-08-12 | End: 2025-08-12

## 2024-08-12 RX ORDER — OLMESARTAN MEDOXOMIL 20 MG/1
20 TABLET ORAL DAILY
Qty: 90 TABLET | Refills: 3 | Status: SHIPPED | OUTPATIENT
Start: 2024-08-12 | End: 2025-08-12

## 2024-08-12 RX ORDER — AMLODIPINE BESYLATE 10 MG/1
10 TABLET ORAL DAILY
Qty: 90 TABLET | Refills: 3 | Status: SHIPPED | OUTPATIENT
Start: 2024-08-12

## 2024-08-12 RX ORDER — ATORVASTATIN CALCIUM 20 MG/1
20 TABLET, FILM COATED ORAL DAILY
Qty: 90 TABLET | Refills: 3 | Status: SHIPPED | OUTPATIENT
Start: 2024-08-12 | End: 2025-08-12

## 2024-08-12 NOTE — PROGRESS NOTES
Ekaterina De La Cruz  2024  [id number]    Karlee Hein MD  Patient Care Team:  Karlee Hein MD as PCP - General (Family Medicine)  Future Appointments       Date Provider Specialty Appt Notes    2024 Leo Tinsley MD Dermatology Discoloration of skin of face [L81.9]    2024  Radiology Type 2 diabetes mellitus without complication, without long-term current use of insulin [E11.9]; Encounter for osteoporosis screening in asymptomatic postmenopausal patient [Z13.820, Z78.0]    2024 Alycia Lundy MD Ophthalmology Type 2 diabetes mellitus without complication, without long-term current use of insulin [E11.9]    2024 Karlee Hein MD Primary Care 3 month f/u             Hosp/ED/Urgent Care:    Visit Type:   Chief Complaint:  Chief Complaint   Patient presents with    Follow-up       History of Present Illness:   Routine follow up. Need all meds refilled. Just received mounjaro, first 5 mg last week, tolerated well  Need care gap addressed and rescheduled - canceled a few due to caring for mother. Mother  recently. Pt is coping ok though not sleeping.  Declined counseling.     The following were reviewed: Active problem list, medication list, allergies, family history, social history, and Health Maintenance.     Medications have been reviewed and reconciled with patient at visit today.      Review of Systems   Respiratory:  Negative for shortness of breath.    Cardiovascular:  Negative for chest pain, palpitations and leg swelling.      See HPI above  PHQ-4 Score: 3    Exam:  Vitals:    24 1408   BP: 126/70   Pulse: 79   Temp: 97 °F (36.1 °C)     Weight: 100 kg (220 lb 7.4 oz)   Body mass index is 36.69 kg/m².    BP Readings from Last 3 Encounters:   24 126/70   24 130/72   24 (!) 140/86        Wt Readings from Last 3 Encounters:   24 1408 100 kg (220 lb 7.4 oz)   24 0711 99.6 kg (219 lb 9.3 oz)   24 1035 99.6 kg (219 lb 9.3 oz)        Physical  Exam     Laboratory Reviewed    Lab Results   Component Value Date    WBC 8.81 02/19/2024    HGB 11.3 (L) 02/19/2024    HCT 35.8 (L) 02/19/2024     02/19/2024    CHOL 194 02/19/2024    TRIG 165 (H) 02/19/2024    HDL 42 02/19/2024    ALT 32 02/19/2024    AST 23 02/19/2024     02/19/2024    K 4.3 02/19/2024     02/19/2024    CREATININE 0.8 02/19/2024    BUN 15 02/19/2024    CO2 24 02/19/2024    TSH 0.623 02/19/2024    HGBA1C 6.7 (H) 02/19/2024         Health Maintenance  Health Maintenance Topics with due status: Not Due       Topic Last Completion Date    Lipid Panel 02/19/2024    Influenza Vaccine 02/26/2024    Diabetes Urine Screening 02/26/2024    Low Dose Statin 04/23/2024    Foot Exam 04/23/2024    Mammogram 05/08/2024    Colorectal Cancer Screening Not Due     Health Maintenance Due   Topic Date Due    TETANUS VACCINE  Never done    DEXA Scan  Never done    Shingles Vaccine (1 of 2) Never done    RSV Vaccine (Age 60+ and Pregnant patients) (1 - 1-dose 60+ series) Never done    Eye Exam  08/23/2023    Hemoglobin A1c  08/19/2024       Assessment and Plan   64 y.o. female with multiple co-morbid illnesses here for continued follow up of medical problems.      The primary encounter diagnosis was Type 2 diabetes mellitus without complication, without long-term current use of insulin. Diagnoses of Essential hypertension, Hyperlipidemia associated with type 2 diabetes mellitus, and Discoloration of skin were also pertinent to this visit.  1. Type 2 diabetes mellitus without complication, without long-term current use of insulin  Overview:  Dx 4 -5 years ago. On metformin    Orders:  -     metFORMIN (GLUCOPHAGE-XR) 500 MG ER 24hr tablet; Take 1 tablet (500 mg total) by mouth daily with breakfast.  Dispense: 90 tablet; Refill: 3  -     olmesartan (BENICAR) 20 MG tablet; Take 1 tablet (20 mg total) by mouth once daily.  Dispense: 90 tablet; Refill: 3    2. Essential hypertension  Overview:  On  olmesartan 20 and amlodipine 10  Bp goal <130/80    Orders:  -     amLODIPine (NORVASC) 10 MG tablet; Take 1 tablet (10 mg total) by mouth once daily.  Dispense: 90 tablet; Refill: 3  -     olmesartan (BENICAR) 20 MG tablet; Take 1 tablet (20 mg total) by mouth once daily.  Dispense: 90 tablet; Refill: 3    3. Hyperlipidemia associated with type 2 diabetes mellitus  Overview:  LDL goal <70    Orders:  -     atorvastatin (LIPITOR) 20 MG tablet; Take 1 tablet (20 mg total) by mouth once daily.  Dispense: 90 tablet; Refill: 3    4. Discoloration of skin        Health Maintenance         Date Due Completion Date    TETANUS VACCINE Never done ---    DEXA Scan Never done ---    Shingles Vaccine (1 of 2) Never done ---    RSV Vaccine (Age 60+ and Pregnant patients) (1 - 1-dose 60+ series) Never done ---    Eye Exam 08/23/2023 8/23/2022    Hemoglobin A1c 08/19/2024 2/19/2024    COVID-19 Vaccine (3 - 2023-24 season) 02/23/2025 (Originally 9/1/2023) 4/22/2021    Influenza Vaccine (1) 09/01/2024 2/26/2024    Lipid Panel 02/19/2025 2/19/2024    Diabetes Urine Screening 02/26/2025 2/26/2024    Low Dose Statin 04/23/2025 4/23/2024    Foot Exam 04/23/2025 4/23/2024    Override on 4/23/2024: Done    Override on 2/26/2024: Done (little toe fungal nails, otherwise nl)    Mammogram 05/08/2025 5/8/2024    Colorectal Cancer Screening 08/10/2025 ---            Discharge instructions        -Patient's lab results were reviewed and discussed with patient  -Treatment options and alternatives were discussed with the patient. Patient expressed understanding. Patient was given the opportunity to ask questions and be an active participant in their medical care. Patient had no further questions or concerns at this time.     Follow up: Follow up in about 3 months (around 11/12/2024).

## 2024-08-12 NOTE — PATIENT INSTRUCTIONS
If you are feeling unwell, we'd like to be the first ones to know here at [facility]! Please give us a call. Same day appointments are our top priority to keep you well and out of the emergency rooms and hospitals. Call [phone] for our direct line. After hours advice is always available. Please call [phone] after hours to speak to the on-call team.      Tetanus boost due - at local pharmacy

## 2024-08-13 ENCOUNTER — OFFICE VISIT (OUTPATIENT)
Dept: DERMATOLOGY | Facility: CLINIC | Age: 64
End: 2024-08-13
Payer: MEDICARE

## 2024-08-13 VITALS — WEIGHT: 220.44 LBS | HEIGHT: 65 IN | BODY MASS INDEX: 36.73 KG/M2

## 2024-08-13 DIAGNOSIS — L81.1 MELASMA: Primary | ICD-10-CM

## 2024-08-13 DIAGNOSIS — L81.9 DISCOLORATION OF SKIN OF FACE: ICD-10-CM

## 2024-08-13 PROCEDURE — 3061F NEG MICROALBUMINURIA REV: CPT | Mod: CPTII,S$GLB,, | Performed by: STUDENT IN AN ORGANIZED HEALTH CARE EDUCATION/TRAINING PROGRAM

## 2024-08-13 PROCEDURE — 1126F AMNT PAIN NOTED NONE PRSNT: CPT | Mod: CPTII,S$GLB,, | Performed by: STUDENT IN AN ORGANIZED HEALTH CARE EDUCATION/TRAINING PROGRAM

## 2024-08-13 PROCEDURE — 3066F NEPHROPATHY DOC TX: CPT | Mod: CPTII,S$GLB,, | Performed by: STUDENT IN AN ORGANIZED HEALTH CARE EDUCATION/TRAINING PROGRAM

## 2024-08-13 PROCEDURE — 3008F BODY MASS INDEX DOCD: CPT | Mod: CPTII,S$GLB,, | Performed by: STUDENT IN AN ORGANIZED HEALTH CARE EDUCATION/TRAINING PROGRAM

## 2024-08-13 PROCEDURE — 1160F RVW MEDS BY RX/DR IN RCRD: CPT | Mod: CPTII,S$GLB,, | Performed by: STUDENT IN AN ORGANIZED HEALTH CARE EDUCATION/TRAINING PROGRAM

## 2024-08-13 PROCEDURE — 99999 PR PBB SHADOW E&M-EST. PATIENT-LVL III: CPT | Mod: PBBFAC,,, | Performed by: STUDENT IN AN ORGANIZED HEALTH CARE EDUCATION/TRAINING PROGRAM

## 2024-08-13 PROCEDURE — 3044F HG A1C LEVEL LT 7.0%: CPT | Mod: CPTII,S$GLB,, | Performed by: STUDENT IN AN ORGANIZED HEALTH CARE EDUCATION/TRAINING PROGRAM

## 2024-08-13 PROCEDURE — 3288F FALL RISK ASSESSMENT DOCD: CPT | Mod: CPTII,S$GLB,, | Performed by: STUDENT IN AN ORGANIZED HEALTH CARE EDUCATION/TRAINING PROGRAM

## 2024-08-13 PROCEDURE — G2211 COMPLEX E/M VISIT ADD ON: HCPCS | Mod: S$GLB,,, | Performed by: STUDENT IN AN ORGANIZED HEALTH CARE EDUCATION/TRAINING PROGRAM

## 2024-08-13 PROCEDURE — 1101F PT FALLS ASSESS-DOCD LE1/YR: CPT | Mod: CPTII,S$GLB,, | Performed by: STUDENT IN AN ORGANIZED HEALTH CARE EDUCATION/TRAINING PROGRAM

## 2024-08-13 PROCEDURE — 4010F ACE/ARB THERAPY RXD/TAKEN: CPT | Mod: CPTII,S$GLB,, | Performed by: STUDENT IN AN ORGANIZED HEALTH CARE EDUCATION/TRAINING PROGRAM

## 2024-08-13 PROCEDURE — 99204 OFFICE O/P NEW MOD 45 MIN: CPT | Mod: S$GLB,,, | Performed by: STUDENT IN AN ORGANIZED HEALTH CARE EDUCATION/TRAINING PROGRAM

## 2024-08-13 PROCEDURE — 1159F MED LIST DOCD IN RCRD: CPT | Mod: CPTII,S$GLB,, | Performed by: STUDENT IN AN ORGANIZED HEALTH CARE EDUCATION/TRAINING PROGRAM

## 2024-08-13 NOTE — PROGRESS NOTES
Subjective:       Patient ID:  Ekaterina De La Cruz is a 64 y.o. female who presents for   Chief Complaint   Patient presents with    Skin Discoloration     .History of Present Illness: The patient presents with chief complaint of darkened discoloration and pigmentaiton.  Location: face, mostly on the forehead and cheek areas  Duration: ongoing for years, getting worse  Signs/Symptoms: brownish, splotchy darkened discolored patches on the face.  Prior treatments: none. Does not wear sunscreen.       Skin Discoloration        Review of Systems   Constitutional:  Negative for fever and chills.   Skin:  Negative for itching, rash and dry skin.        Objective:    Physical Exam   Constitutional: She appears well-developed and well-nourished. No distress.   Neurological: She is alert and oriented to person, place, and time. She is not disoriented.   Psychiatric: She has a normal mood and affect.   Skin:   Areas Examined (abnormalities noted in diagram):   Head / Face Inspection Performed  Neck Inspection Performed  RUE Inspected  LUE Inspection Performed              Diagram Legend     Erythematous scaling macule/papule c/w actinic keratosis       Vascular papule c/w angioma      Pigmented verrucoid papule/plaque c/w seborrheic keratosis      Yellow umbilicated papule c/w sebaceous hyperplasia      Irregularly shaped tan macule c/w lentigo     1-2 mm smooth white papules consistent with Milia      Movable subcutaneous cyst with punctum c/w epidermal inclusion cyst      Subcutaneous movable cyst c/w pilar cyst      Firm pink to brown papule c/w dermatofibroma      Pedunculated fleshy papule(s) c/w skin tag(s)      Evenly pigmented macule c/w junctional nevus     Mildly variegated pigmented, slightly irregular-bordered macule c/w mildly atypical nevus      Flesh colored to evenly pigmented papule c/w intradermal nevus       Pink pearly papule/plaque c/w basal cell carcinoma      Erythematous hyperkeratotic cursted plaque  c/w SCC      Surgical scar with no sign of skin cancer recurrence      Open and closed comedones      Inflammatory papules and pustules      Verrucoid papule consistent consistent with wart     Erythematous eczematous patches and plaques     Dystrophic onycholytic nail with subungual debris c/w onychomycosis     Umbilicated papule    Erythematous-base heme-crusted tan verrucoid plaque consistent with inflamed seborrheic keratosis     Erythematous Silvery Scaling Plaque c/w Psoriasis     See annotation      Assessment / Plan:        Melasma - start Hydroquinone: 4.5%/Tretinoin: 0.025%/Fluocinolone: 0.01%/Niacinamide: 4% combination cream (sent via [facility]). Sun protection and avoidance discussed, SPF 30+ daily.            Follow up in about 4 months (around 12/13/2024).

## 2024-09-25 DIAGNOSIS — E11.9 TYPE 2 DIABETES MELLITUS WITHOUT COMPLICATION: ICD-10-CM

## 2024-10-28 ENCOUNTER — TELEPHONE (OUTPATIENT)
Dept: PRIMARY CARE CLINIC | Facility: CLINIC | Age: 64
End: 2024-10-28
Payer: MEDICARE

## 2024-10-31 ENCOUNTER — TELEPHONE (OUTPATIENT)
Dept: PRIMARY CARE CLINIC | Facility: CLINIC | Age: 64
End: 2024-10-31
Payer: MEDICARE

## 2024-11-04 ENCOUNTER — OFFICE VISIT (OUTPATIENT)
Dept: PRIMARY CARE CLINIC | Facility: CLINIC | Age: 64
End: 2024-11-04
Payer: MEDICARE

## 2024-11-04 ENCOUNTER — TELEPHONE (OUTPATIENT)
Dept: DERMATOLOGY | Facility: CLINIC | Age: 64
End: 2024-11-04
Payer: MEDICARE

## 2024-11-04 VITALS
DIASTOLIC BLOOD PRESSURE: 78 MMHG | SYSTOLIC BLOOD PRESSURE: 140 MMHG | HEART RATE: 78 BPM | WEIGHT: 220.25 LBS | OXYGEN SATURATION: 97 % | BODY MASS INDEX: 36.69 KG/M2 | TEMPERATURE: 97 F | HEIGHT: 65 IN

## 2024-11-04 DIAGNOSIS — E78.5 HYPERLIPIDEMIA ASSOCIATED WITH TYPE 2 DIABETES MELLITUS: ICD-10-CM

## 2024-11-04 DIAGNOSIS — E11.9 TYPE 2 DIABETES MELLITUS WITHOUT COMPLICATION: ICD-10-CM

## 2024-11-04 DIAGNOSIS — E66.01 SEVERE OBESITY (BMI 35.0-39.9) WITH COMORBIDITY: ICD-10-CM

## 2024-11-04 DIAGNOSIS — E11.69 HYPERLIPIDEMIA ASSOCIATED WITH TYPE 2 DIABETES MELLITUS: ICD-10-CM

## 2024-11-04 DIAGNOSIS — E11.9 TYPE 2 DIABETES MELLITUS WITHOUT COMPLICATION, WITHOUT LONG-TERM CURRENT USE OF INSULIN: Primary | ICD-10-CM

## 2024-11-04 DIAGNOSIS — I10 ESSENTIAL HYPERTENSION: ICD-10-CM

## 2024-11-04 LAB
CHOLEST SERPL-MCNC: 145 MG/DL (ref 120–199)
CHOLEST/HDLC SERPL: 3 {RATIO} (ref 2–5)
ESTIMATED AVG GLUCOSE: 126 MG/DL (ref 68–131)
HBA1C MFR BLD: 6 % (ref 4–5.6)
HDLC SERPL-MCNC: 48 MG/DL (ref 40–75)
HDLC SERPL: 33.1 % (ref 20–50)
LDLC SERPL CALC-MCNC: 86.8 MG/DL (ref 63–159)
NONHDLC SERPL-MCNC: 97 MG/DL
TRIGL SERPL-MCNC: 51 MG/DL (ref 30–150)

## 2024-11-04 PROCEDURE — 99999 PR PBB SHADOW E&M-EST. PATIENT-LVL IV: CPT | Mod: PBBFAC,,, | Performed by: FAMILY MEDICINE

## 2024-11-04 PROCEDURE — 99214 OFFICE O/P EST MOD 30 MIN: CPT | Mod: S$GLB,,, | Performed by: FAMILY MEDICINE

## 2024-11-04 PROCEDURE — 3008F BODY MASS INDEX DOCD: CPT | Mod: CPTII,S$GLB,, | Performed by: FAMILY MEDICINE

## 2024-11-04 PROCEDURE — 3066F NEPHROPATHY DOC TX: CPT | Mod: CPTII,S$GLB,, | Performed by: FAMILY MEDICINE

## 2024-11-04 PROCEDURE — 3044F HG A1C LEVEL LT 7.0%: CPT | Mod: CPTII,S$GLB,, | Performed by: FAMILY MEDICINE

## 2024-11-04 PROCEDURE — 83036 HEMOGLOBIN GLYCOSYLATED A1C: CPT | Performed by: FAMILY MEDICINE

## 2024-11-04 PROCEDURE — 1159F MED LIST DOCD IN RCRD: CPT | Mod: CPTII,S$GLB,, | Performed by: FAMILY MEDICINE

## 2024-11-04 PROCEDURE — 3077F SYST BP >= 140 MM HG: CPT | Mod: CPTII,S$GLB,, | Performed by: FAMILY MEDICINE

## 2024-11-04 PROCEDURE — 3078F DIAST BP <80 MM HG: CPT | Mod: CPTII,S$GLB,, | Performed by: FAMILY MEDICINE

## 2024-11-04 PROCEDURE — 1126F AMNT PAIN NOTED NONE PRSNT: CPT | Mod: CPTII,S$GLB,, | Performed by: FAMILY MEDICINE

## 2024-11-04 PROCEDURE — 1101F PT FALLS ASSESS-DOCD LE1/YR: CPT | Mod: CPTII,S$GLB,, | Performed by: FAMILY MEDICINE

## 2024-11-04 PROCEDURE — 3061F NEG MICROALBUMINURIA REV: CPT | Mod: CPTII,S$GLB,, | Performed by: FAMILY MEDICINE

## 2024-11-04 PROCEDURE — 3288F FALL RISK ASSESSMENT DOCD: CPT | Mod: CPTII,S$GLB,, | Performed by: FAMILY MEDICINE

## 2024-11-04 PROCEDURE — 4010F ACE/ARB THERAPY RXD/TAKEN: CPT | Mod: CPTII,S$GLB,, | Performed by: FAMILY MEDICINE

## 2024-11-04 PROCEDURE — 80061 LIPID PANEL: CPT | Performed by: FAMILY MEDICINE

## 2024-11-04 PROCEDURE — 1160F RVW MEDS BY RX/DR IN RCRD: CPT | Mod: CPTII,S$GLB,, | Performed by: FAMILY MEDICINE

## 2024-11-04 NOTE — PROGRESS NOTES
Ekaterina De La Cruz  11/04/2024  [id number]    Karlee Hein MD  Patient Care Team:  Karlee Hein MD as PCP - General (Family Medicine)  Future Appointments       Date Provider Specialty Appt Notes    1/27/2025 Karlee Hein MD Primary Care 3 month f/u             Chief Complaint:  Chief Complaint   Patient presents with    Follow-up     3 month f/u     Medication Problem     Medication change        History of Present Illness:   Routine 3 m fv review ongoing issues and address care gaps.    Discussed exercises, limited by spinal kirby, does morning chair exercises  Having trouble sourcing mounjaro 5 mg. Pharmacist recommending higher doses. Tolerated 5 mg  Tolerated atorvastatin 20    Review of Systems   Respiratory:  Negative for shortness of breath.    Cardiovascular:  Negative for chest pain, palpitations and leg swelling.   Musculoskeletal:  Positive for back pain.      See HPI above  No data recorded    Exam:  Vitals:    11/04/24 0954   BP: (!) 140/78   Pulse: 78   Temp: 97.3 °F (36.3 °C)     Weight: 99.9 kg (220 lb 3.8 oz)   Body mass index is 36.65 kg/m².    BP Readings from Last 3 Encounters:   11/04/24 (!) 140/78   08/12/24 126/70   03/25/24 130/72        Wt Readings from Last 3 Encounters:   11/04/24 0954 99.9 kg (220 lb 3.8 oz)   08/13/24 1101 100 kg (220 lb 7.4 oz)   08/12/24 1408 100 kg (220 lb 7.4 oz)        Physical Exam  Vitals and nursing note reviewed.   Constitutional:       General: She is not in acute distress.     Appearance: She is well-developed. She is not diaphoretic.   HENT:      Head: Normocephalic and atraumatic.   Eyes:      Extraocular Movements: Extraocular movements intact.      Pupils: Pupils are equal, round, and reactive to light.   Cardiovascular:      Rate and Rhythm: Normal rate and regular rhythm.   Pulmonary:      Effort: Pulmonary effort is normal.      Breath sounds: Normal breath sounds.   Musculoskeletal:      Cervical back: Neck supple.   Lymphadenopathy:      Cervical: No  cervical adenopathy.   Skin:     General: Skin is warm and dry.   Neurological:      General: No focal deficit present.      Mental Status: She is alert and oriented to person, place, and time.      Cranial Nerves: No cranial nerve deficit.          Laboratory Reviewed    Lab Results   Component Value Date    WBC 8.81 02/19/2024    HGB 11.3 (L) 02/19/2024    HCT 35.8 (L) 02/19/2024     02/19/2024    CHOL 194 02/19/2024    TRIG 165 (H) 02/19/2024    HDL 42 02/19/2024    ALT 32 02/19/2024    AST 23 02/19/2024     02/19/2024    K 4.3 02/19/2024     02/19/2024    CREATININE 0.8 02/19/2024    BUN 15 02/19/2024    CO2 24 02/19/2024    TSH 0.623 02/19/2024    HGBA1C 6.7 (H) 02/19/2024         Health Maintenance  Health Maintenance Topics with due status: Not Due       Topic Last Completion Date    Colorectal Cancer Screening 08/11/2020    Lipid Panel 02/19/2024    Diabetes Urine Screening 02/26/2024    Foot Exam 04/23/2024    Mammogram 05/08/2024    Low Dose Statin 11/04/2024     Health Maintenance Due   Topic Date Due    TETANUS VACCINE  Never done    DEXA Scan  Never done    Shingles Vaccine (1 of 2) Never done    RSV Vaccine (Age 60+ and Pregnant patients) (1 - Risk 60-74 years 1-dose series) Never done    Eye Exam  08/23/2023    Hemoglobin A1c  08/19/2024    Influenza Vaccine (1) 09/01/2024    COVID-19 Vaccine (3 - 2024-25 season) 09/01/2024       Assessment and Plan   64 y.o. female with multiple co-morbid illnesses here for continued follow up of medical problems.      The primary encounter diagnosis was Type 2 diabetes mellitus without complication, without long-term current use of insulin. Diagnoses of Essential hypertension, Hyperlipidemia associated with type 2 diabetes mellitus, Severe obesity (BMI 35.0-39.9) with comorbidity, and Type 2 diabetes mellitus without complication were also pertinent to this visit.  1. Type 2 diabetes mellitus without complication, without long-term current use of  insulin  Overview:  Dx 4 -5 years ago. On metformin  Diabetes Medications               metFORMIN (GLUCOPHAGE-XR) 500 MG ER 24hr tablet Take 1 tablet (500 mg total) by mouth daily with breakfast.    tirzepatide (MOUNJARO) 5 mg/0.5 mL PnIj Inject 5 mg into the skin every 7 days.              Orders:  -     tirzepatide 10 mg/0.5 mL PnIj; Inject 10 mg into the skin every 7 days.  Dispense: 6 mL; Refill: 0    2. Essential hypertension  Overview:  Hypertension Medications               amLODIPine (NORVASC) 10 MG tablet Take 1 tablet (10 mg total) by mouth once daily.    olmesartan (BENICAR) 20 MG tablet Take 1 tablet (20 mg total) by mouth once daily.           Bp goal <130/80    Assessment & Plan:  Hasn't take bp meds yesterday or today. Instructed to take every day. Was well controlled. Cont monitoring  BP Readings from Last 3 Encounters:   11/04/24 (!) 140/78   08/12/24 126/70   03/25/24 130/72         3. Hyperlipidemia associated with type 2 diabetes mellitus  Overview:  LDL goal <70  Hyperlipidemia Medications               atorvastatin (LIPITOR) 20 MG tablet Take 1 tablet (20 mg total) by mouth once daily.             Assessment & Plan:  Lab Results   Component Value Date    LDLCALC 119.0 02/19/2024        Orders:  -     Lipid Panel; Future; Expected date: 11/04/2024    4. Severe obesity (BMI 35.0-39.9) with comorbidity  Overview:  Tirepatide  5 mg started 3/2024.     Assessment & Plan:  Increase tirepatide to 10 mg. Exercise discussed    Orders:  -     tirzepatide 10 mg/0.5 mL PnIj; Inject 10 mg into the skin every 7 days.  Dispense: 6 mL; Refill: 0    5. Type 2 diabetes mellitus without complication  -     Hemoglobin A1c (Diabetes)        Health Maintenance         Date Due Completion Date    TETANUS VACCINE Never done ---    DEXA Scan Never done ---    Shingles Vaccine (1 of 2) Never done ---    RSV Vaccine (Age 60+ and Pregnant patients) (1 - Risk 60-74 years 1-dose series) Never done ---    Eye Exam 08/23/2023  8/23/2022    Hemoglobin A1c 08/19/2024 2/19/2024    Influenza Vaccine (1) 09/01/2024 2/26/2024    COVID-19 Vaccine (3 - 2024-25 season) 09/01/2024 4/22/2021    Lipid Panel 02/19/2025 2/19/2024    Diabetes Urine Screening 02/26/2025 2/26/2024    Foot Exam 04/23/2025 4/23/2024    Override on 4/23/2024: Done    Override on 2/26/2024: Done (little toe fungal nails, otherwise nl)    Mammogram 05/08/2025 5/8/2024    Colorectal Cancer Screening 08/10/2025 8/11/2020    Low Dose Statin 11/04/2025 11/4/2024            Discharge instructions      Tetanus and shingles at pharmacy  Increase mounjaro to 10 mg  Lipid level today      Follow up: Follow up in about 3 months (around 2/4/2025).

## 2024-11-04 NOTE — ASSESSMENT & PLAN NOTE
Hasn't take bp meds yesterday or today. Instructed to take every day. Was well controlled. Cont monitoring  BP Readings from Last 3 Encounters:   11/04/24 (!) 140/78   08/12/24 126/70   03/25/24 130/72

## 2024-11-04 NOTE — PATIENT INSTRUCTIONS
Tetanus and shingles at pharmacy  Increase mounjaro to 10 mg  Lipid level today    If you are feeling unwell, we'd like to be the first ones to know here at [facility]! Please give us a call. Same day appointments are our top priority to keep you well and out of the emergency rooms and hospitals. Call [phone] for our direct line. After hours advice is always available. Please call [phone] after hours to speak to the on-call team.

## 2025-01-30 ENCOUNTER — OFFICE VISIT (OUTPATIENT)
Dept: DERMATOLOGY | Facility: CLINIC | Age: 65
End: 2025-01-30
Payer: MEDICARE

## 2025-01-30 DIAGNOSIS — L81.1 MELASMA: Primary | ICD-10-CM

## 2025-01-30 PROCEDURE — G2211 COMPLEX E/M VISIT ADD ON: HCPCS | Mod: S$GLB,,, | Performed by: STUDENT IN AN ORGANIZED HEALTH CARE EDUCATION/TRAINING PROGRAM

## 2025-01-30 PROCEDURE — 99999 PR PBB SHADOW E&M-EST. PATIENT-LVL II: CPT | Mod: PBBFAC,,, | Performed by: STUDENT IN AN ORGANIZED HEALTH CARE EDUCATION/TRAINING PROGRAM

## 2025-01-30 PROCEDURE — 1159F MED LIST DOCD IN RCRD: CPT | Mod: CPTII,S$GLB,, | Performed by: STUDENT IN AN ORGANIZED HEALTH CARE EDUCATION/TRAINING PROGRAM

## 2025-01-30 PROCEDURE — 1126F AMNT PAIN NOTED NONE PRSNT: CPT | Mod: CPTII,S$GLB,, | Performed by: STUDENT IN AN ORGANIZED HEALTH CARE EDUCATION/TRAINING PROGRAM

## 2025-01-30 PROCEDURE — 1101F PT FALLS ASSESS-DOCD LE1/YR: CPT | Mod: CPTII,S$GLB,, | Performed by: STUDENT IN AN ORGANIZED HEALTH CARE EDUCATION/TRAINING PROGRAM

## 2025-01-30 PROCEDURE — 99213 OFFICE O/P EST LOW 20 MIN: CPT | Mod: S$GLB,,, | Performed by: STUDENT IN AN ORGANIZED HEALTH CARE EDUCATION/TRAINING PROGRAM

## 2025-01-30 PROCEDURE — 1160F RVW MEDS BY RX/DR IN RCRD: CPT | Mod: CPTII,S$GLB,, | Performed by: STUDENT IN AN ORGANIZED HEALTH CARE EDUCATION/TRAINING PROGRAM

## 2025-01-30 PROCEDURE — 3288F FALL RISK ASSESSMENT DOCD: CPT | Mod: CPTII,S$GLB,, | Performed by: STUDENT IN AN ORGANIZED HEALTH CARE EDUCATION/TRAINING PROGRAM

## 2025-01-30 NOTE — PROGRESS NOTES
Subjective:       Patient ID:  Ekaterina De La Cruz is a 64 y.o. female who presents for   Chief Complaint   Patient presents with    Skin Discoloration     Follow up     History of Present Illness: The patient presents for follow up of melasma, last seen on 8/13/24, at last visit was prescribed start Hydroquinone: 4.5%/Tretinoin: 0.025%/Fluocinolone: 0.01%/Niacinamide: 4% combination cream which she used for a month or so, but then discontinued. Reports that she has not seen much improvement in symptoms. Does try to wear sunscreen. Would like to discuss other treatment options.       Skin Discoloration        Review of Systems   Constitutional:  Negative for fever and chills.   Skin:  Negative for itching, rash and dry skin.        Objective:    Physical Exam   Constitutional: She appears well-developed and well-nourished. No distress.   Neurological: She is alert and oriented to person, place, and time. She is not disoriented.   Psychiatric: She has a normal mood and affect.   Skin:   Areas Examined (abnormalities noted in diagram):   Head / Face Inspection Performed  Neck Inspection Performed  RUE Inspected  LUE Inspection Performed              Diagram Legend     Erythematous scaling macule/papule c/w actinic keratosis       Vascular papule c/w angioma      Pigmented verrucoid papule/plaque c/w seborrheic keratosis      Yellow umbilicated papule c/w sebaceous hyperplasia      Irregularly shaped tan macule c/w lentigo     1-2 mm smooth white papules consistent with Milia      Movable subcutaneous cyst with punctum c/w epidermal inclusion cyst      Subcutaneous movable cyst c/w pilar cyst      Firm pink to brown papule c/w dermatofibroma      Pedunculated fleshy papule(s) c/w skin tag(s)      Evenly pigmented macule c/w junctional nevus     Mildly variegated pigmented, slightly irregular-bordered macule c/w mildly atypical nevus      Flesh colored to evenly pigmented papule c/w intradermal nevus       Pink pearly  papule/plaque c/w basal cell carcinoma      Erythematous hyperkeratotic cursted plaque c/w SCC      Surgical scar with no sign of skin cancer recurrence      Open and closed comedones      Inflammatory papules and pustules      Verrucoid papule consistent consistent with wart     Erythematous eczematous patches and plaques     Dystrophic onycholytic nail with subungual debris c/w onychomycosis     Umbilicated papule    Erythematous-base heme-crusted tan verrucoid plaque consistent with inflamed seborrheic keratosis     Erythematous Silvery Scaling Plaque c/w Psoriasis     See annotation      Assessment / Plan:        Melasma - Discussed long term management and expectations. Discussed other topical agents, including chemical peels. For now, recommend to continue Hydroquinone: 4.5%/Tretinoin: 0.025%/Fluocinolone: 0.01%/Niacinamide: 4% combination cream (sent via [facility]) for at least 3 months duration. Strict sun protection and avoidance, SPF 30+ daily.              Follow up in about 3 months (around 4/30/2025).

## 2025-02-26 DIAGNOSIS — E11.9 TYPE 2 DIABETES MELLITUS WITHOUT COMPLICATION, WITHOUT LONG-TERM CURRENT USE OF INSULIN: ICD-10-CM

## 2025-03-05 DIAGNOSIS — E11.9 TYPE 2 DIABETES MELLITUS WITHOUT COMPLICATION: ICD-10-CM

## 2025-03-14 ENCOUNTER — OFFICE VISIT (OUTPATIENT)
Dept: PRIMARY CARE CLINIC | Facility: CLINIC | Age: 65
End: 2025-03-14
Payer: MEDICARE

## 2025-03-14 ENCOUNTER — PATIENT OUTREACH (OUTPATIENT)
Dept: ADMINISTRATIVE | Facility: HOSPITAL | Age: 65
End: 2025-03-14
Payer: MEDICARE

## 2025-03-14 VITALS
SYSTOLIC BLOOD PRESSURE: 136 MMHG | HEIGHT: 65 IN | OXYGEN SATURATION: 97 % | HEART RATE: 82 BPM | TEMPERATURE: 97 F | DIASTOLIC BLOOD PRESSURE: 68 MMHG | WEIGHT: 208.13 LBS | BODY MASS INDEX: 34.68 KG/M2

## 2025-03-14 DIAGNOSIS — Z23 NEED FOR VACCINATION: Primary | ICD-10-CM

## 2025-03-14 DIAGNOSIS — I10 ESSENTIAL HYPERTENSION: ICD-10-CM

## 2025-03-14 DIAGNOSIS — E11.9 TYPE 2 DIABETES MELLITUS WITHOUT COMPLICATION, WITHOUT LONG-TERM CURRENT USE OF INSULIN: ICD-10-CM

## 2025-03-14 DIAGNOSIS — E66.811 OBESITY (BMI 30.0-34.9): ICD-10-CM

## 2025-03-14 DIAGNOSIS — E78.5 HYPERLIPIDEMIA ASSOCIATED WITH TYPE 2 DIABETES MELLITUS: ICD-10-CM

## 2025-03-14 DIAGNOSIS — E11.69 HYPERLIPIDEMIA ASSOCIATED WITH TYPE 2 DIABETES MELLITUS: ICD-10-CM

## 2025-03-14 PROCEDURE — 80053 COMPREHEN METABOLIC PANEL: CPT | Performed by: FAMILY MEDICINE

## 2025-03-14 PROCEDURE — 99999 PR PBB SHADOW E&M-EST. PATIENT-LVL III: CPT | Mod: PBBFAC,,, | Performed by: FAMILY MEDICINE

## 2025-03-14 PROCEDURE — 82570 ASSAY OF URINE CREATININE: CPT | Performed by: FAMILY MEDICINE

## 2025-03-14 RX ORDER — METFORMIN HYDROCHLORIDE 500 MG/1
500 TABLET, EXTENDED RELEASE ORAL
Qty: 90 TABLET | Refills: 3 | Status: SHIPPED | OUTPATIENT
Start: 2025-03-14 | End: 2026-03-14

## 2025-03-14 RX ORDER — OLMESARTAN MEDOXOMIL 20 MG/1
20 TABLET ORAL DAILY
Qty: 90 TABLET | Refills: 3 | Status: SHIPPED | OUTPATIENT
Start: 2025-03-14 | End: 2026-03-14

## 2025-03-14 RX ORDER — TIRZEPATIDE 15 MG/.5ML
15 INJECTION, SOLUTION SUBCUTANEOUS
Qty: 12 PEN | Refills: 0 | Status: SHIPPED | OUTPATIENT
Start: 2025-03-14

## 2025-03-14 RX ORDER — ATORVASTATIN CALCIUM 20 MG/1
20 TABLET, FILM COATED ORAL DAILY
Qty: 90 TABLET | Refills: 3 | Status: SHIPPED | OUTPATIENT
Start: 2025-03-14 | End: 2026-03-14

## 2025-03-14 RX ORDER — AMLODIPINE BESYLATE 10 MG/1
10 TABLET ORAL DAILY
Qty: 90 TABLET | Refills: 3 | Status: SHIPPED | OUTPATIENT
Start: 2025-03-14

## 2025-03-14 NOTE — ASSESSMENT & PLAN NOTE
Tolerated tirepatied 10 mg.   Increase tirepatide to 15 mg. Exercise restarted. Lost 12 lbs past 3 mo

## 2025-03-14 NOTE — PROGRESS NOTES
Ekaterina De La Cruz  03/14/2025  [id number]    Karlee Hein MD  Patient Care Team:  Karlee Hein MD as PCP - General (Family Medicine)  Hailee Suarez, RN as Registered Nurse    Future Appointments   Date Time Provider Department Center   4/30/2025 10:45 AM Leo Tinsley MD [facility] DERM [place]   5/23/2025  3:15 PM Alycia Lundy MD [facility] OPHTHAL [place]   6/6/2025  2:20 PM Karlee Hein MD [facility]      Chief Complaint:  Chief Complaint   Patient presents with    Follow-up         History of Present Illness:  Routine follow up. Tolerated tirzepatide 10 mg well. HgA1C decreased to 6.0 from upper 6s, lost 12 lbs in 3 months  Still has hunger attack at night. No hypoglycemic episodes. Rejoined Gym.   Need medication refills    Review of patient's allergies indicates:  No Known Allergies  Medications Ordered Prior to Encounter[1]    Review of Systems   Constitutional: Negative.    Respiratory: Negative.     Cardiovascular: Negative.    Gastrointestinal: Negative.    Skin: Negative.      See HPI above    No data recorded     Exam:    Physical Exam  Vitals and nursing note reviewed.   Constitutional:       General: She is not in acute distress.     Appearance: She is well-developed. She is not diaphoretic.   HENT:      Head: Normocephalic and atraumatic.   Eyes:      Extraocular Movements: Extraocular movements intact.      Pupils: Pupils are equal, round, and reactive to light.   Cardiovascular:      Rate and Rhythm: Normal rate and regular rhythm.   Pulmonary:      Effort: Pulmonary effort is normal.      Breath sounds: Normal breath sounds.   Musculoskeletal:      Cervical back: Neck supple.   Lymphadenopathy:      Cervical: No cervical adenopathy.   Skin:     General: Skin is warm and dry.   Neurological:      General: No focal deficit present.      Mental Status: She is alert and oriented to person, place, and time.      Cranial Nerves: No cranial nerve  "deficit.         Vitals:    03/14/25 1316   BP: 136/68   Pulse: 82   Temp: 97.3 °F (36.3 °C)     Weight: 94.4 kg (208 lb 1.8 oz)  BP Readings from Last 3 Encounters:   03/14/25 136/68   11/04/24 (!) 140/78   08/12/24 126/70     Wt Readings from Last 3 Encounters:   03/14/25 1316 94.4 kg (208 lb 1.8 oz)   11/04/24 0954 99.9 kg (220 lb 3.8 oz)   08/13/24 1101 100 kg (220 lb 7.4 oz)        Laboratory Reviewed:       Lab Results   Component Value Date    WBC 8.81 02/19/2024    HGB 11.3 (L) 02/19/2024    HCT 35.8 (L) 02/19/2024     02/19/2024    CHOL 145 11/04/2024    TRIG 51 11/04/2024    HDL 48 11/04/2024    ALT 32 02/19/2024    AST 23 02/19/2024     02/19/2024    K 4.3 02/19/2024     02/19/2024    CREATININE 0.8 02/19/2024    BUN 15 02/19/2024    CO2 24 02/19/2024    TSH 0.623 02/19/2024    HGBA1C 6.0 (H) 11/04/2024       Screening or Prevention Patient's value Goal Complete/Controlled?   HgA1C Testing and Control   Lab Results   Component Value Date    HGBA1C 6.0 (H) 11/04/2024      Annually/Less than 8% Yes   Lipid profile : 11/04/2024 Annually Yes   LDL control Lab Results   Component Value Date    LDLCALC 86.8 11/04/2024    Annually/Less than 100 mg/dl  Yes   Nephropathy screening Lab Results   Component Value Date    LABMICR 5.0 02/26/2024     No results found for: "PROTEINUA" Annually No   Blood pressure BP Readings from Last 1 Encounters:   03/14/25 136/68    Less than 140/90 Yes   Dilated retinal exam : 08/23/2022 Annually Yes   Foot exam   : 04/23/2024 Annually Yes       Health Maintenance  Health Maintenance Topics with due status: Not Due       Topic Last Completion Date    Colorectal Cancer Screening 08/11/2020    Lipid Panel 11/04/2024    Hemoglobin A1c 11/04/2024    Low Dose Statin 03/14/2025     Health Maintenance Due   Topic Date Due    TETANUS VACCINE  Never done    DEXA Scan  Never done    Shingles Vaccine (1 of 2) Never done    RSV Vaccine (Age 60+ and Pregnant patients) (1 - Risk " 60-74 years 1-dose series) Never done    Diabetic Eye Exam  08/23/2023    Influenza Vaccine (1) 09/01/2024    COVID-19 Vaccine (3 - 2024-25 season) 09/01/2024    Diabetes Urine Screening  02/26/2025    Foot Exam  04/23/2025    Mammogram  05/08/2025       Assessment/Plan:  1. Essential hypertension  Overview:  Hypertension Medications               amLODIPine (NORVASC) 10 MG tablet Take 1 tablet (10 mg total) by mouth once daily.    olmesartan (BENICAR) 20 MG tablet Take 1 tablet (20 mg total) by mouth once daily.           Bp goal <130/80    Assessment & Plan:  Bp at goal today  Continuing current management.       Orders:  -     amLODIPine (NORVASC) 10 MG tablet; Take 1 tablet (10 mg total) by mouth once daily.  Dispense: 90 tablet; Refill: 3  -     olmesartan (BENICAR) 20 MG tablet; Take 1 tablet (20 mg total) by mouth once daily.  Dispense: 90 tablet; Refill: 3    2. Hyperlipidemia associated with type 2 diabetes mellitus  Comments:  go up on atorvastatin to 20 mg. no SEs reported  Overview:  LDL goal <70  Hyperlipidemia Medications               atorvastatin (LIPITOR) 20 MG tablet Take 1 tablet (20 mg total) by mouth once daily.             Assessment & Plan:  Lab Results   Component Value Date    LDLCALC 86.8 11/04/2024    Continuing current management.     Orders:  -     atorvastatin (LIPITOR) 20 MG tablet; Take 1 tablet (20 mg total) by mouth once daily.  Dispense: 90 tablet; Refill: 3    3. Type 2 diabetes mellitus without complication, without long-term current use of insulin  Overview:  Dx 4 -5 years ago. On metformin  Diabetes Medications               metFORMIN (GLUCOPHAGE-XR) 500 MG ER 24hr tablet Take 1 tablet (500 mg total) by mouth daily with breakfast.    tirzepatide (MOUNJARO) 5 mg/0.5 mL PnIj Inject 5 mg into the skin every 7 days.              Assessment & Plan:  Lab Results   Component Value Date    HGBA1C 6.0 (H) 11/04/2024    Continuing current management.     Orders:  -     metFORMIN  (GLUCOPHAGE-XR) 500 MG ER 24hr tablet; Take 1 tablet (500 mg total) by mouth daily with breakfast.  Dispense: 90 tablet; Refill: 3  -     olmesartan (BENICAR) 20 MG tablet; Take 1 tablet (20 mg total) by mouth once daily.  Dispense: 90 tablet; Refill: 3  -     tirzepatide (MOUNJARO) 15 mg/0.5 mL PnIj; Inject 15 mg into the skin every 7 days.  Dispense: 12 Pen; Refill: 0  -     Ambulatory referral/consult to Ophthalmology; Future; Expected date: 03/21/2025  -     Comprehensive Metabolic Panel  -     Microalbumin/creatinine urine ratio (Diabetes)    4. Type 2 diabetes mellitus without complication, without long-term current use of insulin  Overview:  Dx 4 -5 years ago. On metformin  Diabetes Medications               metFORMIN (GLUCOPHAGE-XR) 500 MG ER 24hr tablet Take 1 tablet (500 mg total) by mouth daily with breakfast.    tirzepatide (MOUNJARO) 5 mg/0.5 mL PnIj Inject 5 mg into the skin every 7 days.              Assessment & Plan:  Lab Results   Component Value Date    HGBA1C 6.0 (H) 11/04/2024    Continuing current management.     Orders:  -     metFORMIN (GLUCOPHAGE-XR) 500 MG ER 24hr tablet; Take 1 tablet (500 mg total) by mouth daily with breakfast.  Dispense: 90 tablet; Refill: 3  -     olmesartan (BENICAR) 20 MG tablet; Take 1 tablet (20 mg total) by mouth once daily.  Dispense: 90 tablet; Refill: 3  -     tirzepatide (MOUNJARO) 15 mg/0.5 mL PnIj; Inject 15 mg into the skin every 7 days.  Dispense: 12 Pen; Refill: 0  -     Ambulatory referral/consult to Ophthalmology; Future; Expected date: 03/21/2025  -     Comprehensive Metabolic Panel  -     Microalbumin/creatinine urine ratio (Diabetes)    5. Obesity (BMI 30.0-34.9)  Overview:  Tirepatide  5 mg started 3/2024.     Assessment & Plan:  Tolerated tirepatied 10 mg.   Increase tirepatide to 15 mg. Exercise restarted. Lost 12 lbs past 3 mo            After visit summary printed and given to patient upon discharge. Health maintenance needs, recent test  results, current management plans and goals of care discussed with patient/family. All questions answered. Pt and family are encouraged to be part of care team at all times      The following issues were discussed: Diagnoses of Essential hypertension, Hyperlipidemia associated with type 2 diabetes mellitus, Type 2 diabetes mellitus without complication, without long-term current use of insulin, Type 2 diabetes mellitus without complication, without long-term current use of insulin, and Obesity (BMI 30.0-34.9) were pertinent to this visit.       Karlee Hein MD  [facility]  [address] [place] [address]         [1]   Current Outpatient Medications on File Prior to Visit   Medication Sig Dispense Refill    [DISCONTINUED] amLODIPine (NORVASC) 10 MG tablet Take 1 tablet (10 mg total) by mouth once daily. 90 tablet 3    [DISCONTINUED] atorvastatin (LIPITOR) 20 MG tablet Take 1 tablet (20 mg total) by mouth once daily. 90 tablet 3    [DISCONTINUED] metFORMIN (GLUCOPHAGE-XR) 500 MG ER 24hr tablet Take 1 tablet (500 mg total) by mouth daily with breakfast. 90 tablet 3    [DISCONTINUED] olmesartan (BENICAR) 20 MG tablet Take 1 tablet (20 mg total) by mouth once daily. 90 tablet 3     No current facility-administered medications on file prior to visit.

## 2025-03-14 NOTE — ASSESSMENT & PLAN NOTE
Lab Results   Component Value Date    HGBA1C 6.0 (H) 11/04/2024    Continuing current management.

## 2025-03-15 LAB
ALBUMIN SERPL BCP-MCNC: 3.8 G/DL (ref 3.5–5.2)
ALBUMIN/CREAT UR: 5.2 UG/MG (ref 0–30)
ALP SERPL-CCNC: 121 U/L (ref 40–150)
ALT SERPL W/O P-5'-P-CCNC: 24 U/L (ref 10–44)
ANION GAP SERPL CALC-SCNC: 8 MMOL/L (ref 8–16)
AST SERPL-CCNC: 26 U/L (ref 10–40)
BILIRUB SERPL-MCNC: 0.4 MG/DL (ref 0.1–1)
BUN SERPL-MCNC: 15 MG/DL (ref 8–23)
CALCIUM SERPL-MCNC: 9.9 MG/DL (ref 8.7–10.5)
CHLORIDE SERPL-SCNC: 109 MMOL/L (ref 95–110)
CO2 SERPL-SCNC: 25 MMOL/L (ref 23–29)
CREAT SERPL-MCNC: 0.9 MG/DL (ref 0.5–1.4)
CREAT UR-MCNC: 194 MG/DL (ref 15–325)
EST. GFR  (NO RACE VARIABLE): >60 ML/MIN/1.73 M^2
GLUCOSE SERPL-MCNC: 97 MG/DL (ref 70–110)
MICROALBUMIN UR DL<=1MG/L-MCNC: 10 UG/ML
POTASSIUM SERPL-SCNC: 3.9 MMOL/L (ref 3.5–5.1)
PROT SERPL-MCNC: 8 G/DL (ref 6–8.4)
SODIUM SERPL-SCNC: 142 MMOL/L (ref 136–145)

## 2025-03-17 ENCOUNTER — RESULTS FOLLOW-UP (OUTPATIENT)
Dept: PRIMARY CARE CLINIC | Facility: CLINIC | Age: 65
End: 2025-03-17

## 2025-05-29 ENCOUNTER — TELEPHONE (OUTPATIENT)
Dept: PHARMACY | Facility: CLINIC | Age: 65
End: 2025-05-29
Payer: MEDICARE

## 2025-05-29 NOTE — TELEPHONE ENCOUNTER
[facility]/Population Health Chart Review & Patient Outreach Details For Medication Adherence Project    Reason for Outreach Encounter: 3rd Party payor non-compliance report ([employer], [employer], C, etc)  2.  Patient Outreach Method: Reviewed patient chart   3.   Medication in question:    Diabetes Medications              metFORMIN (GLUCOPHAGE-XR) 500 MG ER 24hr tablet Take 1 tablet (500 mg total) by mouth daily with breakfast.    tirzepatide (MOUNJARO) 15 mg/0.5 mL PnIj Inject 15 mg into the skin every 7 days.                 metformin  last filled  3/21 for 90 day supply      4.  Reviewed and or Updates Made To: Patient Chart  5. Outreach Outcomes and/or actions taken: Patient filled medication and is on track to be adherent  Additional Notes:

## 2025-06-02 ENCOUNTER — TELEPHONE (OUTPATIENT)
Dept: PRIMARY CARE CLINIC | Facility: CLINIC | Age: 65
End: 2025-06-02
Payer: MEDICARE

## 2025-06-04 DIAGNOSIS — E11.9 TYPE 2 DIABETES MELLITUS WITHOUT COMPLICATION: ICD-10-CM

## 2025-06-09 ENCOUNTER — OFFICE VISIT (OUTPATIENT)
Dept: PRIMARY CARE CLINIC | Facility: CLINIC | Age: 65
End: 2025-06-09
Payer: MEDICARE

## 2025-06-09 ENCOUNTER — PATIENT MESSAGE (OUTPATIENT)
Dept: PULMONOLOGY | Facility: CLINIC | Age: 65
End: 2025-06-09
Payer: MEDICARE

## 2025-06-09 VITALS
DIASTOLIC BLOOD PRESSURE: 62 MMHG | SYSTOLIC BLOOD PRESSURE: 118 MMHG | TEMPERATURE: 97 F | HEIGHT: 65 IN | BODY MASS INDEX: 33.72 KG/M2 | WEIGHT: 202.38 LBS | OXYGEN SATURATION: 97 % | HEART RATE: 99 BPM

## 2025-06-09 DIAGNOSIS — E11.9 TYPE 2 DIABETES MELLITUS WITHOUT COMPLICATION, WITHOUT LONG-TERM CURRENT USE OF INSULIN: ICD-10-CM

## 2025-06-09 DIAGNOSIS — G47.9 SLEEP DISTURBANCE: Primary | ICD-10-CM

## 2025-06-09 DIAGNOSIS — Z12.11 SCREENING FOR COLON CANCER: ICD-10-CM

## 2025-06-09 DIAGNOSIS — Z12.31 SCREENING MAMMOGRAM FOR BREAST CANCER: ICD-10-CM

## 2025-06-09 PROCEDURE — 4010F ACE/ARB THERAPY RXD/TAKEN: CPT | Mod: CPTII,S$GLB,, | Performed by: FAMILY MEDICINE

## 2025-06-09 PROCEDURE — 1126F AMNT PAIN NOTED NONE PRSNT: CPT | Mod: CPTII,S$GLB,, | Performed by: FAMILY MEDICINE

## 2025-06-09 PROCEDURE — 3008F BODY MASS INDEX DOCD: CPT | Mod: CPTII,S$GLB,, | Performed by: FAMILY MEDICINE

## 2025-06-09 PROCEDURE — G2211 COMPLEX E/M VISIT ADD ON: HCPCS | Mod: S$GLB,,, | Performed by: FAMILY MEDICINE

## 2025-06-09 PROCEDURE — 3078F DIAST BP <80 MM HG: CPT | Mod: CPTII,S$GLB,, | Performed by: FAMILY MEDICINE

## 2025-06-09 PROCEDURE — 3288F FALL RISK ASSESSMENT DOCD: CPT | Mod: CPTII,S$GLB,, | Performed by: FAMILY MEDICINE

## 2025-06-09 PROCEDURE — 99999 PR PBB SHADOW E&M-EST. PATIENT-LVL V: CPT | Mod: PBBFAC,,, | Performed by: FAMILY MEDICINE

## 2025-06-09 PROCEDURE — 3066F NEPHROPATHY DOC TX: CPT | Mod: CPTII,S$GLB,, | Performed by: FAMILY MEDICINE

## 2025-06-09 PROCEDURE — 3074F SYST BP LT 130 MM HG: CPT | Mod: CPTII,S$GLB,, | Performed by: FAMILY MEDICINE

## 2025-06-09 PROCEDURE — 99214 OFFICE O/P EST MOD 30 MIN: CPT | Mod: S$GLB,,, | Performed by: FAMILY MEDICINE

## 2025-06-09 PROCEDURE — 1101F PT FALLS ASSESS-DOCD LE1/YR: CPT | Mod: CPTII,S$GLB,, | Performed by: FAMILY MEDICINE

## 2025-06-09 PROCEDURE — 85025 COMPLETE CBC W/AUTO DIFF WBC: CPT | Performed by: FAMILY MEDICINE

## 2025-06-09 PROCEDURE — 1160F RVW MEDS BY RX/DR IN RCRD: CPT | Mod: CPTII,S$GLB,, | Performed by: FAMILY MEDICINE

## 2025-06-09 PROCEDURE — 1159F MED LIST DOCD IN RCRD: CPT | Mod: CPTII,S$GLB,, | Performed by: FAMILY MEDICINE

## 2025-06-09 PROCEDURE — 3061F NEG MICROALBUMINURIA REV: CPT | Mod: CPTII,S$GLB,, | Performed by: FAMILY MEDICINE

## 2025-06-09 RX ORDER — TIRZEPATIDE 15 MG/.5ML
15 INJECTION, SOLUTION SUBCUTANEOUS
Qty: 12 PEN | Refills: 0 | Status: SHIPPED | OUTPATIENT
Start: 2025-06-09

## 2025-06-09 NOTE — PROGRESS NOTES
Ekaterina De La Cruz  06/09/2025  [id number]    Karlee Hein MD  Patient Care Team:  Karlee Hein MD as PCP - General (Family Medicine)  Hailee Suarez, RN as Registered Nurse  Future Appointments       Date Provider Specialty Appt Notes    7/23/2025 Flako Caraballo, OD Ophthalmology .    8/29/2025 Karlee Hein MD Primary Care 3 mth f/u             Chief Complaint:  Chief Complaint   Patient presents with    Follow-up     3 month f/u        History of Present Illness:   Pt presents to clinic today for routine follow-up.  Medications and recent tests reviewed.  Care gaps addressed. All primary care related questions answered to patient's satisfaction.   Happy with mounjaro. Lost another 6 lbs. Except still waking up every night at 2-3 am craving food sweets. Habit since childhood. No cravings during the day since Mounjaro but night time hunger persists.     Review of Systems   Constitutional: Negative.    HENT: Negative.     Respiratory: Negative.     Cardiovascular: Negative.    Gastrointestinal: Negative.    Genitourinary: Negative.    Musculoskeletal: Negative.    Skin: Negative.    Neuropsych:       Note from 3/14/2025  Routine follow up. Tolerated tirzepatide 10 mg well. HgA1C decreased to 6.0 from upper 6s, lost 12 lbs in 3 months  Still has hunger attack at night. No hypoglycemic episodes. Rejoined Gym.   Need medication refills    Review of Systems   See HPI above  No data recorded    Exam:  Vitals:    06/09/25 1049   BP: 118/62   Pulse: 99   Temp: 96.8 °F (36 °C)     Weight: 91.8 kg (202 lb 6.1 oz)   Body mass index is 33.68 kg/m².    BP Readings from Last 3 Encounters:   06/09/25 118/62   03/14/25 136/68   11/04/24 (!) 140/78        Wt Readings from Last 3 Encounters:   06/09/25 1049 91.8 kg (202 lb 6.1 oz)   03/14/25 1316 94.4 kg (208 lb 1.8 oz)   11/04/24 0954 99.9 kg (220 lb 3.8 oz)        Physical Exam  Vitals and nursing note reviewed.   Constitutional:       General: She is not in acute distress.      Palliative re-consult placed.  Palliative spoke with daughter, Yancy, to inform of patient decline in health.  Patient confused and unable to make decisions at this time.  Daughter lives in [place] and will arrive Friday night to visit with patient.  Possible comfort care on Friday.  Palliative care will continue to follow.    Sammi WERNER RN, [facility]  Palliative Care    Appearance: She is well-developed. She is not diaphoretic.   HENT:      Head: Normocephalic and atraumatic.   Eyes:      Extraocular Movements: Extraocular movements intact.      Pupils: Pupils are equal, round, and reactive to light.   Cardiovascular:      Rate and Rhythm: Normal rate and regular rhythm.   Pulmonary:      Effort: Pulmonary effort is normal.      Breath sounds: Normal breath sounds.   Musculoskeletal:      Cervical back: Neck supple.   Lymphadenopathy:      Cervical: No cervical adenopathy.   Skin:     General: Skin is warm and dry.   Neurological:      General: No focal deficit present.      Mental Status: She is alert and oriented to person, place, and time.      Cranial Nerves: No cranial nerve deficit.          Laboratory Reviewed    Lab Results   Component Value Date    WBC 8.81 02/19/2024    HGB 11.3 (L) 02/19/2024    HCT 35.8 (L) 02/19/2024     02/19/2024    CHOL 145 11/04/2024    TRIG 51 11/04/2024    HDL 48 11/04/2024    ALT 24 03/14/2025    AST 26 03/14/2025     03/14/2025    K 3.9 03/14/2025     03/14/2025    CREATININE 0.9 03/14/2025    BUN 15 03/14/2025    CO2 25 03/14/2025    TSH 0.623 02/19/2024    HGBA1C 6.0 (H) 11/04/2024         Health Maintenance  Health Maintenance Topics with due status: Not Due       Topic Last Completion Date    Lipid Panel 11/04/2024    Diabetes Urine Screening 03/14/2025    Low Dose Statin 06/09/2025     Health Maintenance Due   Topic Date Due    TETANUS VACCINE  Never done    DEXA Scan  Never done    Shingles Vaccine (1 of 2) Never done    RSV Vaccine (Age 60+ and Pregnant patients) (1 - Risk 60-74 years 1-dose series) Never done    Diabetic Eye Exam  08/23/2023    COVID-19 Vaccine (3 - 2024-25 season) 09/01/2024    Foot Exam  04/23/2025    Hemoglobin A1c  05/04/2025    Mammogram  05/08/2025    Colorectal Cancer Screening  08/10/2025       Assessment and Plan   65 y.o. female with multiple co-morbid illnesses here for continued follow up  of medical problems.      The primary encounter diagnosis was Sleep disturbance. Diagnoses of Type 2 diabetes mellitus without complication, without long-term current use of insulin, Screening mammogram for breast cancer, and Screening for colon cancer were also pertinent to this visit.  1. Sleep disturbance  -     Ambulatory referral/consult to Sleep Disorders; Future; Expected date: 06/16/2025    2. Type 2 diabetes mellitus without complication, without long-term current use of insulin  Overview:  Dx 4 -5 years ago. On metformin  Diabetes Medications               metFORMIN (GLUCOPHAGE-XR) 500 MG ER 24hr tablet Take 1 tablet (500 mg total) by mouth daily with breakfast.    tirzepatide (MOUNJARO) 5 mg/0.5 mL PnIj Inject 5 mg into the skin every 7 days.              Orders:  -     Ambulatory referral/consult to Podiatry; Future; Expected date: 06/16/2025  -     tirzepatide (MOUNJARO) 15 mg/0.5 mL PnIj; Inject 15 mg into the skin every 7 days.  Dispense: 12 Pen; Refill: 0  -     CBC Auto Differential; Future; Expected date: 06/09/2025    3. Screening mammogram for breast cancer  -     Mammo Digital Screening Bilat w/ Allan (XPD); Future; Expected date: 06/09/2025    4. Screening for colon cancer  -     Ambulatory referral/consult to Endo Procedure ; Future; Expected date: 09/09/2025        Health Maintenance         Date Due Completion Date    TETANUS VACCINE Never done ---    DEXA Scan Never done ---    Shingles Vaccine (1 of 2) Never done ---    RSV Vaccine (Age 60+ and Pregnant patients) (1 - Risk 60-74 years 1-dose series) Never done ---    Diabetic Eye Exam 08/23/2023 8/23/2022    COVID-19 Vaccine (3 - 2024-25 season) 09/01/2024 4/22/2021    Foot Exam 04/23/2025 4/23/2024    Override on 4/23/2024: Done    Override on 2/26/2024: Done (little toe fungal nails, otherwise nl)    Hemoglobin A1c 05/04/2025 11/4/2024    Mammogram 05/08/2025 5/8/2024    Colorectal Cancer Screening 08/10/2025 8/11/2020    Lipid  Panel 11/04/2025 11/4/2024    Diabetes Urine Screening 03/14/2026 3/14/2025    Low Dose Statin 06/09/2026 6/9/2025            Discharge instructions     After visit summary printed and given to patient upon discharge. Health maintenance needs, recent test results, current management plans and goals of care discussed with patient/family. All questions answered. Pt and family are encouraged to be part of care team at all times     Follow up: Follow up in about 3 months (around 9/9/2025).

## 2025-06-09 NOTE — PATIENT INSTRUCTIONS
Will call you with sleep study result    If you are feeling unwell, we[facility] the [facility] ones to [facility] Please give us a call. Same day appointments are our top priority to keep you well and out of the emergency rooms and hospitals. Call [phone] for our direct line. After hours advice is always available. Please call [phone] after hours to speak to the on-call team.

## 2025-06-10 LAB
ABSOLUTE EOSINOPHIL (OHS): 0.29 K/UL
ABSOLUTE MONOCYTE (OHS): 0.4 K/UL (ref 0.3–1)
ABSOLUTE NEUTROPHIL COUNT (OHS): 4.33 K/UL (ref 1.8–7.7)
BASOPHILS # BLD AUTO: 0.04 K/UL
BASOPHILS NFR BLD AUTO: 0.5 %
ERYTHROCYTE [DISTWIDTH] IN BLOOD BY AUTOMATED COUNT: 14.7 % (ref 11.5–14.5)
HCT VFR BLD AUTO: 35.9 % (ref 37–48.5)
HGB BLD-MCNC: 11 GM/DL (ref 12–16)
IMM GRANULOCYTES # BLD AUTO: 0.03 K/UL (ref 0–0.04)
IMM GRANULOCYTES NFR BLD AUTO: 0.4 % (ref 0–0.5)
LYMPHOCYTES # BLD AUTO: 2.23 K/UL (ref 1–4.8)
MCH RBC QN AUTO: 29.1 PG (ref 27–31)
MCHC RBC AUTO-ENTMCNC: 30.6 G/DL (ref 32–36)
MCV RBC AUTO: 95 FL (ref 82–98)
NUCLEATED RBC (/100WBC) (OHS): 0 /100 WBC
PLATELET # BLD AUTO: 401 K/UL (ref 150–450)
PMV BLD AUTO: 9.7 FL (ref 9.2–12.9)
RBC # BLD AUTO: 3.78 M/UL (ref 4–5.4)
RELATIVE EOSINOPHIL (OHS): 4 %
RELATIVE LYMPHOCYTE (OHS): 30.5 % (ref 18–48)
RELATIVE MONOCYTE (OHS): 5.5 % (ref 4–15)
RELATIVE NEUTROPHIL (OHS): 59.1 % (ref 38–73)
WBC # BLD AUTO: 7.32 K/UL (ref 3.9–12.7)

## 2025-06-12 ENCOUNTER — RESULTS FOLLOW-UP (OUTPATIENT)
Dept: PRIMARY CARE CLINIC | Facility: CLINIC | Age: 65
End: 2025-06-12

## 2025-06-27 ENCOUNTER — OFFICE VISIT (OUTPATIENT)
Dept: PODIATRY | Facility: CLINIC | Age: 65
End: 2025-06-27
Payer: MEDICARE

## 2025-06-27 ENCOUNTER — HOSPITAL ENCOUNTER (OUTPATIENT)
Dept: RADIOLOGY | Facility: HOSPITAL | Age: 65
Discharge: HOME | End: 2025-06-27
Attending: FAMILY MEDICINE
Payer: MEDICARE

## 2025-06-27 DIAGNOSIS — E11.9 TYPE 2 DIABETES MELLITUS WITHOUT COMPLICATION, WITHOUT LONG-TERM CURRENT USE OF INSULIN: ICD-10-CM

## 2025-06-27 DIAGNOSIS — L60.3 ONYCHODYSTROPHY: ICD-10-CM

## 2025-06-27 DIAGNOSIS — Z12.31 SCREENING MAMMOGRAM FOR BREAST CANCER: ICD-10-CM

## 2025-06-27 DIAGNOSIS — E11.9 ENCOUNTER FOR COMPREHENSIVE DIABETIC FOOT EXAMINATION, TYPE 2 DIABETES MELLITUS: Primary | ICD-10-CM

## 2025-06-27 PROCEDURE — 77063 BREAST TOMOSYNTHESIS BI: CPT | Mod: TC

## 2025-06-27 PROCEDURE — 99999 PR PBB SHADOW E&M-EST. PATIENT-LVL III: CPT | Mod: PBBFAC,,, | Performed by: PODIATRIST

## 2025-06-27 PROCEDURE — 77067 SCR MAMMO BI INCL CAD: CPT | Mod: 26,,, | Performed by: STUDENT IN AN ORGANIZED HEALTH CARE EDUCATION/TRAINING PROGRAM

## 2025-06-27 PROCEDURE — 77063 BREAST TOMOSYNTHESIS BI: CPT | Mod: 26,,, | Performed by: STUDENT IN AN ORGANIZED HEALTH CARE EDUCATION/TRAINING PROGRAM

## 2025-06-27 NOTE — PROGRESS NOTES
Subjective:       Patient ID: Ekaterina De La Cruz is a 65 y.o. female.    Chief Complaint: Diabetic Foot Exam (63.9.25 last seen pcp bertha Hein. She denies pain at present. And is a diabetic. )    HPI: Ekaterina De La Cruz presents to the office today, under referral by , Karlee Hein MD, for her annual diabetic foot assessment and risk evaluation.  Patient is a DMII.  States 0/10 pain to the right and left foot.  Ambulating with a nonantalgic gait.  Denies any recent falls or injury.  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  This patient last saw his/her internal/family medicine physician on 06/9/2025.     Hemoglobin A1C   Date Value Ref Range Status   11/04/2024 6.0 (H) 4.0 - 5.6 % Final     Comment:     ADA Screening Guidelines:  5.7-6.4%  Consistent with prediabetes  >or=6.5%  Consistent with diabetes    High levels of fetal hemoglobin interfere with the HbA1C  assay. Heterozygous hemoglobin variants (HbS, HgC, etc)do  not significantly interfere with this assay.   However, presence of multiple variants may affect accuracy.     02/19/2024 6.7 (H) 4.0 - 5.6 % Final     Comment:     ADA Screening Guidelines:  5.7-6.4%  Consistent with prediabetes  >or=6.5%  Consistent with diabetes    High levels of fetal hemoglobin interfere with the HbA1C  assay. Heterozygous hemoglobin variants (HbS, HgC, etc)do  not significantly interfere with this assay.   However, presence of multiple variants may affect accuracy.     12/19/2022 6.8 (H) 4.2 - 5.8 % Final   08/23/2022 6.5 (H) 4.2 - 5.8 % Final   04/08/2022 6.9 (H) 4.2 - 5.8 % Final   .    Review of patient's allergies indicates:  No Known Allergies    Past Medical History:   Diagnosis Date    Hypertension        Family History   Problem Relation Name Age of Onset    Breast cancer Mother         Social History     Socioeconomic History    Marital status: Single   Tobacco Use    Smoking status: Never    Smokeless tobacco: Never   Substance and Sexual Activity     Alcohol use: Never       History reviewed. No pertinent surgical history.    Review of Systems        Objective:   There were no vitals taken for this visit.    Physical Exam  LOWER EXTREMITY PHYSICAL EXAMINATION    ORTHOPEDIC: No pain on palpation of the foot or ankle.   Range of motion within normal limits of the ankle joint, rearfoot, and forefoot.  Manual muscle strength testing is 5/5 with dorsiflexion, plantar flexion, abduction and abduction of the lower extremity.  No pain with or without resistance.  The patient is a full to ambulate without pain or discomfort.  The patient's gait is non antalgic.  The patient does not utilize any assistive device for ambulation.    VASCULAR:  The right dorsalis pedis pulse 2/4 and the right posterior tibial pulse 2/4.  The left dorsalis pedis pulse 2/4 and posterior tibial pulse on the left is 2/4.  Capillary refill is intact.  Pedal hair growth intact    NEUROLOGY:  Sharp/dull, light touch, proprioception all intact and equal bilaterally.  Vibratory sensation is intact.  Protective sensation intact    DERMATOLOGY:  Skin is dry intact.  There is no signs of callusing, ulcerations, other lesions identified to the dorsal or plantar aspect of the right or left foot.  The R1, 5 and left L1,5 are thickened, discolored dystrophic.  There is subungual debris.  Nail plates have area of dark discoloration.  The remaining nails 2-4 on the right foot and the left foot are elongated but of normal color, thickness, and texture.   There is no signs of ingrowing into the medial or lateral borders.  There is no evidence of wounds or skin breakdown.  No edema or erythema.  No obvious lacerations or fissuring.  Interdigital spaces are clean, dry, intact.  No rashes or scars appreciated.    Assessment:     1. Encounter for comprehensive diabetic foot examination, type 2 diabetes mellitus    2. Type 2 diabetes mellitus without complication, without long-term current use of insulin    3.  Onychodystrophy        Plan:     Encounter for comprehensive diabetic foot examination, type 2 diabetes mellitus    Type 2 diabetes mellitus without complication, without long-term current use of insulin  -     Ambulatory referral/consult to Podiatry    Onychodystrophy    Thorough discussion is had with the patient today, concerning the diagnosis, its etiology, and the treatment algorithm at present.    Diabetic Foot Education. Patient reminded of the importance of good nutrition and blood sugar control to help prevent podiatric complications of diabetes. Patient instructed on proper foot hygeine. We discussed wearing proper and supportive shoe gear, daily foot inspections, never walking barefooted or sock footed, never putting sharp instruments to feet which can cause major complications associated with infection, ulcers, lacerations.      Dystrophic nail plates, as outlined above (R#1,5  ; L#1,5 ), are sharply debrided with double action nail nipper, and/or with the assistance of a mechanical rotary brayan, with removal of all offending nail and nail border(s), for reduction of pains. Nails are reduced in terms of length, width and girth with removal of subungual debris to facilitate pain free weight bearing and ambulation. The elongated nails as outlined in the objective portion of this note, were trimmed to appropriate length, with a double action nail nipper, for alleviation/reduction of pains as well. Follow up in approx. 3-4 months.

## 2025-07-23 ENCOUNTER — OFFICE VISIT (OUTPATIENT)
Dept: OPHTHALMOLOGY | Facility: CLINIC | Age: 65
End: 2025-07-23
Payer: MEDICARE

## 2025-07-23 DIAGNOSIS — Z96.1 PSEUDOPHAKIA OF RIGHT EYE: ICD-10-CM

## 2025-07-23 DIAGNOSIS — E11.9 TYPE 2 DIABETES MELLITUS WITHOUT COMPLICATION, WITHOUT LONG-TERM CURRENT USE OF INSULIN: ICD-10-CM

## 2025-07-23 DIAGNOSIS — H25.012 CORTICAL AGE-RELATED CATARACT OF LEFT EYE: Primary | ICD-10-CM

## 2025-07-23 DIAGNOSIS — H43.812 PVD (POSTERIOR VITREOUS DETACHMENT), LEFT EYE: ICD-10-CM

## 2025-07-23 DIAGNOSIS — E11.9 DIABETES MELLITUS WITHOUT COMPLICATION: ICD-10-CM

## 2025-07-23 DIAGNOSIS — H40.013 OAG (OPEN ANGLE GLAUCOMA) SUSPECT, LOW RISK, BILATERAL: ICD-10-CM

## 2025-07-23 PROCEDURE — 3066F NEPHROPATHY DOC TX: CPT | Mod: CPTII,S$GLB,, | Performed by: OPTOMETRIST

## 2025-07-23 PROCEDURE — 99999 PR PBB SHADOW E&M-EST. PATIENT-LVL III: CPT | Mod: PBBFAC,,, | Performed by: OPTOMETRIST

## 2025-07-23 PROCEDURE — 4010F ACE/ARB THERAPY RXD/TAKEN: CPT | Mod: CPTII,S$GLB,, | Performed by: OPTOMETRIST

## 2025-07-23 PROCEDURE — 99204 OFFICE O/P NEW MOD 45 MIN: CPT | Mod: S$GLB,,, | Performed by: OPTOMETRIST

## 2025-07-23 PROCEDURE — 92133 CPTRZD OPH DX IMG PST SGM ON: CPT | Mod: S$GLB,,, | Performed by: OPTOMETRIST

## 2025-07-23 PROCEDURE — 1159F MED LIST DOCD IN RCRD: CPT | Mod: CPTII,S$GLB,, | Performed by: OPTOMETRIST

## 2025-07-23 PROCEDURE — 3061F NEG MICROALBUMINURIA REV: CPT | Mod: CPTII,S$GLB,, | Performed by: OPTOMETRIST

## 2025-07-23 NOTE — PROGRESS NOTES
HPI     Diabetic Eye Exam            Comments: Pt presents for an annual diabetic eye exam.     VA stable. OD PC IOL about 3 years ago; has not done OS- wearing OTC   readers only.     Denies flashes/ floaters/ headaches/ pains at this time.    Lab Results       Component                Value               Date                       HGBA1C                   6.0 (H)             11/04/2024                    Comments    1. DM II (2020)  2. OD PC IOL (2022)- has not done OS               Last edited by Apple Levy on 7/23/2025  2:21 PM.            Assessment /Plan     For exam results, see Encounter Report.    1. 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     2. Pseudophakia of right eye  Observe    3. OAG (open angle glaucoma) suspect, low risk, bilateral  -     OCT, Optic Nerve - OU - Both Eyes  The patient has the following Glaucoma risk factors: Optic Nerve Asymmetry   Recommend close observation off drops at this time with annual gOCT for changes.     4. Diabetes mellitus without complication  Last A1c 6 There was no diabetic retinopathy present on either eye on examination today. Recommend good blood pressure control, strict blood glucose control, and good cholesterol control.  Continue close care with PCP regarding diabetes.    5. PVD (posterior vitreous detachment), left eye  Retina flat, RD precautions reviewed.      Rtc with Dr ZIMMERMAN for CE/IOL eval.

## 2025-07-25 ENCOUNTER — TELEPHONE (OUTPATIENT)
Dept: OPHTHALMOLOGY | Facility: CLINIC | Age: 65
End: 2025-07-25
Payer: MEDICARE

## 2025-07-25 NOTE — TELEPHONE ENCOUNTER
----- Message from Mike Acosta sent at 7/24/2025  2:27 PM CDT -----    ----- Message -----  From: Flako Caraballo OD  Sent: 7/24/2025  10:26 AM CDT  To: Jeff MORAN Staff    Please call to lynne WOLF/ЕЛЕНА ruiz